# Patient Record
Sex: FEMALE | Race: OTHER | NOT HISPANIC OR LATINO
[De-identification: names, ages, dates, MRNs, and addresses within clinical notes are randomized per-mention and may not be internally consistent; named-entity substitution may affect disease eponyms.]

---

## 2017-06-27 ENCOUNTER — APPOINTMENT (OUTPATIENT)
Dept: NEUROLOGY | Facility: CLINIC | Age: 72
End: 2017-06-27

## 2017-06-27 VITALS
HEART RATE: 68 BPM | DIASTOLIC BLOOD PRESSURE: 78 MMHG | SYSTOLIC BLOOD PRESSURE: 119 MMHG | HEIGHT: 60 IN | WEIGHT: 137 LBS | BODY MASS INDEX: 26.9 KG/M2

## 2017-06-27 DIAGNOSIS — G60.8 OTHER HEREDITARY AND IDIOPATHIC NEUROPATHIES: ICD-10-CM

## 2017-06-27 DIAGNOSIS — Z86.79 PERSONAL HISTORY OF OTHER DISEASES OF THE CIRCULATORY SYSTEM: ICD-10-CM

## 2017-07-25 ENCOUNTER — APPOINTMENT (OUTPATIENT)
Dept: NEUROLOGY | Facility: CLINIC | Age: 72
End: 2017-07-25

## 2017-08-23 ENCOUNTER — EMERGENCY (EMERGENCY)
Facility: HOSPITAL | Age: 72
LOS: 1 days | Discharge: HOME | End: 2017-08-23
Attending: EMERGENCY MEDICINE | Admitting: EMERGENCY MEDICINE
Payer: COMMERCIAL

## 2017-08-23 VITALS
HEART RATE: 59 BPM | TEMPERATURE: 98 F | DIASTOLIC BLOOD PRESSURE: 81 MMHG | SYSTOLIC BLOOD PRESSURE: 135 MMHG | WEIGHT: 138.01 LBS | OXYGEN SATURATION: 97 % | RESPIRATION RATE: 16 BRPM | HEIGHT: 60 IN

## 2017-08-23 VITALS
RESPIRATION RATE: 16 BRPM | OXYGEN SATURATION: 95 % | TEMPERATURE: 98 F | HEART RATE: 63 BPM | SYSTOLIC BLOOD PRESSURE: 150 MMHG | DIASTOLIC BLOOD PRESSURE: 87 MMHG

## 2017-08-23 PROCEDURE — 73130 X-RAY EXAM OF HAND: CPT | Mod: 26,RT

## 2017-08-23 PROCEDURE — 73562 X-RAY EXAM OF KNEE 3: CPT | Mod: 26,LT

## 2017-08-23 PROCEDURE — 99284 EMERGENCY DEPT VISIT MOD MDM: CPT | Mod: 25

## 2017-08-23 PROCEDURE — 73130 X-RAY EXAM OF HAND: CPT

## 2017-08-23 PROCEDURE — 73562 X-RAY EXAM OF KNEE 3: CPT

## 2017-08-23 PROCEDURE — 99283 EMERGENCY DEPT VISIT LOW MDM: CPT

## 2017-08-23 RX ORDER — ACETAMINOPHEN 500 MG
650 TABLET ORAL ONCE
Qty: 0 | Refills: 0 | Status: COMPLETED | OUTPATIENT
Start: 2017-08-23 | End: 2017-08-23

## 2017-08-23 RX ADMIN — Medication 650 MILLIGRAM(S): at 11:48

## 2017-08-23 RX ADMIN — Medication 650 MILLIGRAM(S): at 12:26

## 2017-08-23 NOTE — ED PROVIDER NOTE - NS_ ATTENDINGSCRIBEDETAILS _ED_A_ED_FT
Laurent Perez MD - The scribe's documentation has been prepared under my direction and personally reviewed by me in its entirety. I confirm that the note above accurately reflects all work, treatment, procedures, and medical decision making performed by me.

## 2017-08-23 NOTE — ED PROVIDER NOTE - OBJECTIVE STATEMENT
70 y/o F pt presents to ED c/o right hand pain, left knee pain s/p trip and fall on sidewalk. No OTC medication PTA. No LOC. No back pain, neck pain. No further complaints at this time.

## 2017-08-23 NOTE — ED ADULT NURSE REASSESSMENT NOTE - NS ED NURSE REASSESS COMMENT FT1
Left knee was cleaned and dressed, discharge instructions were provided and reviewed, left accompanied by son, in stable condition.

## 2017-08-23 NOTE — ED PROCEDURE NOTE - NS ED PERI VASCULAR NEG
no swelling/no cyanosis of extremity/capillary refill time < 2 seconds/fingers/toes warm to touch/no paresthesia

## 2017-08-23 NOTE — ED ADULT NURSE NOTE - OBJECTIVE STATEMENT
Patient was walking on sidewalk and tripped over a branch, fell forward and put her hand out to prevent fall. Complaining of right hand and wrist pain and left knee pain. Abrasions noted to left knee, swelling and tenderness noted to right hand. Denies LOC, dizziness or head injury. On Eliquis.

## 2017-08-24 DIAGNOSIS — M79.641 PAIN IN RIGHT HAND: ICD-10-CM

## 2017-11-07 ENCOUNTER — APPOINTMENT (OUTPATIENT)
Dept: NEUROLOGY | Facility: CLINIC | Age: 72
End: 2017-11-07
Payer: MEDICARE

## 2017-11-07 PROCEDURE — 95886 MUSC TEST DONE W/N TEST COMP: CPT

## 2017-11-07 PROCEDURE — 95913 NRV CNDJ TEST 13/> STUDIES: CPT

## 2017-12-12 ENCOUNTER — APPOINTMENT (OUTPATIENT)
Dept: NEUROLOGY | Facility: CLINIC | Age: 72
End: 2017-12-12

## 2019-04-07 ENCOUNTER — TRANSCRIPTION ENCOUNTER (OUTPATIENT)
Age: 74
End: 2019-04-07

## 2020-04-09 NOTE — ED ADULT NURSE NOTE - EENT WDL
Cardiac
Eyes with no visual disturbances.  Ears clean and dry and no hearing difficulties. Nose with pink mucosa and no drainage.  Mouth mucous membranes moist and pink.  No tenderness or swelling to throat or neck.

## 2020-09-04 ENCOUNTER — APPOINTMENT (OUTPATIENT)
Dept: INTERNAL MEDICINE | Facility: CLINIC | Age: 75
End: 2020-09-04
Payer: MEDICARE

## 2020-09-04 VITALS — SYSTOLIC BLOOD PRESSURE: 150 MMHG | DIASTOLIC BLOOD PRESSURE: 80 MMHG

## 2020-09-04 VITALS
WEIGHT: 136 LBS | HEART RATE: 83 BPM | BODY MASS INDEX: 26.7 KG/M2 | HEIGHT: 60 IN | SYSTOLIC BLOOD PRESSURE: 144 MMHG | DIASTOLIC BLOOD PRESSURE: 82 MMHG | TEMPERATURE: 97.8 F | OXYGEN SATURATION: 96 %

## 2020-09-04 DIAGNOSIS — L30.9 DERMATITIS, UNSPECIFIED: ICD-10-CM

## 2020-09-04 DIAGNOSIS — Z23 ENCOUNTER FOR IMMUNIZATION: ICD-10-CM

## 2020-09-04 PROBLEM — I10 ESSENTIAL (PRIMARY) HYPERTENSION: Chronic | Status: ACTIVE | Noted: 2017-08-23

## 2020-09-04 PROBLEM — I48.91 UNSPECIFIED ATRIAL FIBRILLATION: Chronic | Status: ACTIVE | Noted: 2017-08-23

## 2020-09-04 PROBLEM — E78.00 PURE HYPERCHOLESTEROLEMIA, UNSPECIFIED: Chronic | Status: ACTIVE | Noted: 2017-08-23

## 2020-09-04 PROCEDURE — 99215 OFFICE O/P EST HI 40 MIN: CPT | Mod: 25

## 2020-09-04 PROCEDURE — G0008: CPT

## 2020-09-04 PROCEDURE — 90653 IIV ADJUVANT VACCINE IM: CPT

## 2020-09-04 RX ORDER — DULOXETINE HYDROCHLORIDE 30 MG/1
30 CAPSULE, DELAYED RELEASE PELLETS ORAL
Qty: 30 | Refills: 1 | Status: DISCONTINUED | COMMUNITY
Start: 2017-06-27 | End: 2020-09-04

## 2020-09-04 NOTE — PHYSICAL EXAM
[No Respiratory Distress] : no respiratory distress  [No Acute Distress] : no acute distress [No Carotid Bruits] : no carotid bruits [Normal] : soft, non-tender, non-distended, no masses palpated, no HSM and normal bowel sounds [de-identified] : S1 S2 IRREGULARLY IRREGULAR  [de-identified] : 1+ PITTING EDEMA LE  [de-identified] : + HYPERPIGMENTED AREA RIGHT UPPER BACK JUST UNDERN BRA STRAP ON THE RIGHT

## 2020-09-04 NOTE — HISTORY OF PRESENT ILLNESS
[FreeTextEntry1] : c/o itching upper back and also noted some mild lower extremity edema x several months  [de-identified] : H/O HTN ,DM , A FIB ,HYPERLIPIDEMIA \par C/O SOME ITCHING UPPER BACK AND SOME EDEMA LOWER EXTREMITIES , TAKES AMKODIPINE ON OCCASION WHEN BP IS ELEVATED , FOLLOWS IN OFFICE INFREQUENTLY BUT SEES HER CARDIOLOGIST ON A REGULAR BASIS

## 2020-09-04 NOTE — ASSESSMENT
[FreeTextEntry1] : CLINICALLY STABLE , ADVISED ROUTINE OFFICE VISITS A MUST \par ADVISED FLU VACCINE \par ADVISED TO INCREEASE DYAZIDE TO ONE TABLET DAILY , LOW SODIUM DIET

## 2020-09-25 ENCOUNTER — RX RENEWAL (OUTPATIENT)
Age: 75
End: 2020-09-25

## 2020-10-27 ENCOUNTER — APPOINTMENT (OUTPATIENT)
Dept: INTERNAL MEDICINE | Facility: CLINIC | Age: 75
End: 2020-10-27
Payer: MEDICARE

## 2020-10-27 ENCOUNTER — NON-APPOINTMENT (OUTPATIENT)
Age: 75
End: 2020-10-27

## 2020-10-27 VITALS
HEIGHT: 59 IN | OXYGEN SATURATION: 98 % | DIASTOLIC BLOOD PRESSURE: 84 MMHG | BODY MASS INDEX: 26.61 KG/M2 | TEMPERATURE: 97.9 F | SYSTOLIC BLOOD PRESSURE: 154 MMHG | WEIGHT: 132 LBS | HEART RATE: 80 BPM

## 2020-10-27 VITALS — DIASTOLIC BLOOD PRESSURE: 80 MMHG | SYSTOLIC BLOOD PRESSURE: 130 MMHG

## 2020-10-27 DIAGNOSIS — N32.81 OVERACTIVE BLADDER: ICD-10-CM

## 2020-10-27 DIAGNOSIS — I48.11 LONGSTANDING PERSISTENT ATRIAL FI: ICD-10-CM

## 2020-10-27 DIAGNOSIS — N39.46 MIXED INCONTINENCE: ICD-10-CM

## 2020-10-27 DIAGNOSIS — Z00.00 ENCOUNTER FOR GENERAL ADULT MEDICAL EXAMINATION W/OUT ABNORMAL FINDINGS: ICD-10-CM

## 2020-10-27 PROCEDURE — G0444 DEPRESSION SCREEN ANNUAL: CPT

## 2020-10-27 PROCEDURE — 99497 ADVNCD CARE PLAN 30 MIN: CPT | Mod: 33,25

## 2020-10-27 PROCEDURE — 36415 COLL VENOUS BLD VENIPUNCTURE: CPT

## 2020-10-27 PROCEDURE — 99213 OFFICE O/P EST LOW 20 MIN: CPT | Mod: 25

## 2020-10-27 PROCEDURE — G0439: CPT

## 2020-10-27 PROCEDURE — 99072 ADDL SUPL MATRL&STAF TM PHE: CPT

## 2020-10-27 PROCEDURE — 93000 ELECTROCARDIOGRAM COMPLETE: CPT | Mod: 59

## 2020-10-27 RX ORDER — AMLODIPINE BESYLATE 2.5 MG/1
2.5 TABLET ORAL
Refills: 0 | Status: DISCONTINUED | COMMUNITY
End: 2020-10-27

## 2020-10-27 NOTE — HEALTH RISK ASSESSMENT
[Patient reported mammogram was normal] : Patient reported mammogram was normal [Patient reported PAP Smear was normal] : Patient reported PAP Smear was normal [0] : 2) Feeling down, depressed, or hopeless: Not at all (0) [[id number]] : 0 [MammogramDate] : 08/2020 [PapSmearDate] : 08/2020 [ColonoscopyComments] : 7-8 YEARS AGO , DR PARKER  [AdvancecareDate] : 10/27/2020 [FreeTextEntry4] : HCP FORMS GIVEN

## 2020-10-27 NOTE — PHYSICAL EXAM
[No Acute Distress] : no acute distress [PERRL] : pupils equal round and reactive to light [EOMI] : extraocular movements intact [Normal Outer Ear/Nose] : the outer ears and nose were normal in appearance [No Lymphadenopathy] : no lymphadenopathy [Thyroid Normal, No Nodules] : the thyroid was normal and there were no nodules present [No Carotid Bruits] : no carotid bruits [No Abdominal Bruit] : a ~M bruit was not heard ~T in the abdomen [No Edema] : there was no peripheral edema [Declined Breast Exam] : declined breast exam  [Normal] : soft, non-tender, non-distended, no masses palpated, no HSM and normal bowel sounds [Declined Rectal Exam] : declined rectal exam [Normal Supraclavicular Nodes] : no supraclavicular lymphadenopathy [Normal Axillary Nodes] : no axillary lymphadenopathy [Normal Posterior Cervical Nodes] : no posterior cervical lymphadenopathy [Normal Anterior Cervical Nodes] : no anterior cervical lymphadenopathy [Normal Inguinal Nodes] : no inguinal lymphadenopathy [No CVA Tenderness] : no CVA  tenderness [No Focal Deficits] : no focal deficits [] : both feet [de-identified] : BY GYN [de-identified] : no suspicious skin lesions  [de-identified] : MULTIPLE DEFORMITIES OF TOES

## 2020-10-27 NOTE — HISTORY OF PRESENT ILLNESS
[de-identified] : PATIENT W/ H/O HTN , NIDDM , ATRIAL FIBRILLATION FOR ANNUAL PHYSICAL , C/O NEED TO URINATE FREQUENTLY AT TIMES AND HAS THE URGE TO GO TO THE BR AND UNABLE TO HOLD IT MANY TIMES , NO DYSURIA , HEMATURIA

## 2020-10-27 NOTE — ASSESSMENT
[FreeTextEntry1] : CLINICALLY STABLE , STATIN WAS D/C BY CARDIOLOGY , WILL CHECK LFT,  WILL GIVE TRIAL OF DITROPAN XL 5MG DAILY , RTO IN 4-6 WEEKS

## 2020-10-28 LAB
ALBUMIN SERPL ELPH-MCNC: 2.7 G/DL
ALP BLD-CCNC: 63 U/L
ALT SERPL-CCNC: 18 U/L
ANION GAP SERPL CALC-SCNC: 9 MMOL/L
AST SERPL-CCNC: 21 U/L
BASOPHILS # BLD AUTO: 0.05 K/UL
BASOPHILS NFR BLD AUTO: 0.7 %
BILIRUB SERPL-MCNC: 0.2 MG/DL
BUN SERPL-MCNC: 13 MG/DL
CALCIUM SERPL-MCNC: 8.6 MG/DL
CHLORIDE SERPL-SCNC: 103 MMOL/L
CHOLEST SERPL-MCNC: 421 MG/DL
CO2 SERPL-SCNC: 28 MMOL/L
CREAT SERPL-MCNC: 0.77 MG/DL
CREAT SPEC-SCNC: 100 MG/DL
DIGOXIN SERPL-MCNC: 0.6 NG/ML
EOSINOPHIL # BLD AUTO: 0.17 K/UL
EOSINOPHIL NFR BLD AUTO: 2.5 %
ESTIMATED AVERAGE GLUCOSE: 128 MG/DL
GLUCOSE SERPL-MCNC: 103 MG/DL
GLUCOSE SERPL-MCNC: 99 MG/DL
HBA1C MFR BLD HPLC: 6.1 %
HCT VFR BLD CALC: 45.7 %
HDLC SERPL-MCNC: 34 MG/DL
HGB BLD-MCNC: 15.3 G/DL
IMM GRANULOCYTES NFR BLD AUTO: 1.2 %
LDLC SERPL CALC-MCNC: NORMAL MG/DL
LYMPHOCYTES # BLD AUTO: 2.21 K/UL
LYMPHOCYTES NFR BLD AUTO: 32.9 %
MAN DIFF?: NORMAL
MCHC RBC-ENTMCNC: 30.8 PG
MCHC RBC-ENTMCNC: 33.5 GM/DL
MCV RBC AUTO: 92.1 FL
MICROALBUMIN 24H UR DL<=1MG/L-MCNC: 332.4 MG/DL
MICROALBUMIN/CREAT 24H UR-RTO: 3336 MG/G
MONOCYTES # BLD AUTO: 0.58 K/UL
MONOCYTES NFR BLD AUTO: 8.6 %
NEUTROPHILS # BLD AUTO: 3.63 K/UL
NEUTROPHILS NFR BLD AUTO: 54.1 %
NONHDLC SERPL-MCNC: 386 MG/DL
PLATELET # BLD AUTO: 266 K/UL
POTASSIUM SERPL-SCNC: 3.7 MMOL/L
PROT SERPL-MCNC: 4.8 G/DL
RBC # BLD: 4.96 M/UL
RBC # FLD: 12.5 %
SODIUM SERPL-SCNC: 140 MMOL/L
TRIGL SERPL-MCNC: 464 MG/DL
WBC # FLD AUTO: 6.72 K/UL

## 2020-10-30 LAB
APPEARANCE: CLEAR
BACTERIA UR CULT: NORMAL
BACTERIA: ABNORMAL
BILIRUBIN URINE: NEGATIVE
BLOOD URINE: NEGATIVE
COLOR: YELLOW
GLUCOSE QUALITATIVE U: NEGATIVE
KETONES URINE: NEGATIVE
LEUKOCYTE ESTERASE URINE: NEGATIVE
MICROSCOPIC-UA: NORMAL
NITRITE URINE: NEGATIVE
PH URINE: 7
PROTEIN URINE: ABNORMAL
RED BLOOD CELLS URINE: 2 /HPF
SPECIFIC GRAVITY URINE: 1.02
SQUAMOUS EPITHELIAL CELLS: 6 /HPF
UROBILINOGEN URINE: NORMAL
WHITE BLOOD CELLS URINE: 5 /HPF

## 2020-11-27 ENCOUNTER — APPOINTMENT (OUTPATIENT)
Dept: INTERNAL MEDICINE | Facility: CLINIC | Age: 75
End: 2020-11-27
Payer: MEDICARE

## 2020-11-27 VITALS
SYSTOLIC BLOOD PRESSURE: 135 MMHG | DIASTOLIC BLOOD PRESSURE: 94 MMHG | HEART RATE: 74 BPM | OXYGEN SATURATION: 95 % | WEIGHT: 135 LBS | BODY MASS INDEX: 27.27 KG/M2

## 2020-11-27 PROCEDURE — 81003 URINALYSIS AUTO W/O SCOPE: CPT | Mod: QW

## 2020-11-27 PROCEDURE — 99213 OFFICE O/P EST LOW 20 MIN: CPT

## 2020-11-27 NOTE — PHYSICAL EXAM
[No Acute Distress] : no acute distress [No Lymphadenopathy] : no lymphadenopathy [No Edema] : there was no peripheral edema [Normal] : no CVA or spinal tenderness

## 2020-11-30 LAB
APPEARANCE: ABNORMAL
BACTERIA UR CULT: NORMAL
BACTERIA: NEGATIVE
BILIRUB UR QL STRIP: NORMAL
BILIRUBIN URINE: NEGATIVE
BLOOD URINE: NORMAL
COLOR: YELLOW
GLUCOSE QUALITATIVE U: ABNORMAL
GLUCOSE UR-MCNC: 100
HCG UR QL: 0.2 EU/DL
HGB UR QL STRIP.AUTO: NORMAL
HYALINE CASTS: 4 /LPF
KETONES UR-MCNC: NORMAL
KETONES URINE: NEGATIVE
LEUKOCYTE ESTERASE UR QL STRIP: NORMAL
LEUKOCYTE ESTERASE URINE: NEGATIVE
MICROSCOPIC-UA: NORMAL
NITRITE UR QL STRIP: NORMAL
NITRITE URINE: NEGATIVE
PH UR STRIP: 7
PH URINE: 7
PROT UR STRIP-MCNC: NORMAL
PROTEIN URINE: ABNORMAL
RED BLOOD CELLS URINE: 6 /HPF
SP GR UR STRIP: 1.02
SPECIFIC GRAVITY URINE: 1.01
SQUAMOUS EPITHELIAL CELLS: 9 /HPF
UROBILINOGEN URINE: NORMAL
WHITE BLOOD CELLS URINE: 10 /HPF

## 2020-12-04 ENCOUNTER — APPOINTMENT (OUTPATIENT)
Dept: INTERNAL MEDICINE | Facility: CLINIC | Age: 75
End: 2020-12-04
Payer: MEDICARE

## 2020-12-04 VITALS
OXYGEN SATURATION: 97 % | HEART RATE: 75 BPM | SYSTOLIC BLOOD PRESSURE: 141 MMHG | WEIGHT: 130 LBS | BODY MASS INDEX: 26.26 KG/M2 | DIASTOLIC BLOOD PRESSURE: 88 MMHG

## 2020-12-04 DIAGNOSIS — N39.0 URINARY TRACT INFECTION, SITE NOT SPECIFIED: ICD-10-CM

## 2020-12-04 PROCEDURE — 81003 URINALYSIS AUTO W/O SCOPE: CPT | Mod: QW

## 2020-12-04 PROCEDURE — 99213 OFFICE O/P EST LOW 20 MIN: CPT

## 2020-12-04 PROCEDURE — 99072 ADDL SUPL MATRL&STAF TM PHE: CPT

## 2020-12-04 NOTE — ASSESSMENT
[FreeTextEntry1] : ADVISED TO INCREASE PO FLUIDS , U/A AND CULTURE , WILL PRESCRIBE MACROBID X 5 DAYS

## 2020-12-04 NOTE — HISTORY OF PRESENT ILLNESS
[FreeTextEntry1] : URINARY FREQUENCY  [de-identified] : PATIENT C/O URINARY FREQUENCY X SEVERAL DAYS , NO FEVER , CHILLS , GROSS HEMATURIA , DOES C/O SOME ITCHING IN AREA , NO VAGINAL DISCHARGE ,

## 2020-12-04 NOTE — PHYSICAL EXAM
[No Acute Distress] : no acute distress [No Lymphadenopathy] : no lymphadenopathy [No Edema] : there was no peripheral edema [Normal] : soft, non-tender, non-distended, no masses palpated, no HSM and normal bowel sounds [No CVA Tenderness] : no CVA  tenderness

## 2020-12-07 LAB
APPEARANCE: CLEAR
BACTERIA: NEGATIVE
BILIRUB UR QL STRIP: NORMAL
BILIRUBIN URINE: NEGATIVE
BLOOD URINE: NORMAL
COLOR: COLORLESS
GLUCOSE QUALITATIVE U: NEGATIVE
GLUCOSE UR-MCNC: NORMAL
HCG UR QL: 0.2 EU/DL
HGB UR QL STRIP.AUTO: NORMAL
HYALINE CASTS: 0 /LPF
KETONES UR-MCNC: NORMAL
KETONES URINE: NEGATIVE
LEUKOCYTE ESTERASE UR QL STRIP: NORMAL
LEUKOCYTE ESTERASE URINE: NEGATIVE
MICROSCOPIC-UA: NORMAL
NITRITE UR QL STRIP: NORMAL
NITRITE URINE: NEGATIVE
PH UR STRIP: 7
PH URINE: 7
PROT UR STRIP-MCNC: NORMAL
PROTEIN URINE: ABNORMAL
RED BLOOD CELLS URINE: 7 /HPF
SP GR UR STRIP: 1.01
SPECIFIC GRAVITY URINE: 1.01
SQUAMOUS EPITHELIAL CELLS: 3 /HPF
UROBILINOGEN URINE: NORMAL
WHITE BLOOD CELLS URINE: 4 /HPF

## 2020-12-08 ENCOUNTER — RX RENEWAL (OUTPATIENT)
Age: 75
End: 2020-12-08

## 2021-01-22 ENCOUNTER — RX RENEWAL (OUTPATIENT)
Age: 76
End: 2021-01-22

## 2021-01-22 RX ORDER — LANCETS 28 GAUGE
EACH MISCELLANEOUS
Qty: 100 | Refills: 4 | Status: ACTIVE | COMMUNITY
Start: 2020-12-08 | End: 1900-01-01

## 2021-02-03 ENCOUNTER — APPOINTMENT (OUTPATIENT)
Dept: INTERNAL MEDICINE | Facility: CLINIC | Age: 76
End: 2021-02-03

## 2021-02-26 ENCOUNTER — APPOINTMENT (OUTPATIENT)
Dept: INTERNAL MEDICINE | Facility: CLINIC | Age: 76
End: 2021-02-26

## 2021-04-12 ENCOUNTER — APPOINTMENT (OUTPATIENT)
Dept: DISASTER EMERGENCY | Facility: OTHER | Age: 76
End: 2021-04-12
Payer: MEDICARE

## 2021-04-12 PROCEDURE — 0012A: CPT

## 2021-05-07 ENCOUNTER — APPOINTMENT (OUTPATIENT)
Dept: INTERNAL MEDICINE | Facility: CLINIC | Age: 76
End: 2021-05-07
Payer: MEDICARE

## 2021-05-07 ENCOUNTER — NON-APPOINTMENT (OUTPATIENT)
Age: 76
End: 2021-05-07

## 2021-05-07 VITALS
HEART RATE: 73 BPM | WEIGHT: 136 LBS | SYSTOLIC BLOOD PRESSURE: 134 MMHG | BODY MASS INDEX: 27.42 KG/M2 | OXYGEN SATURATION: 97 % | DIASTOLIC BLOOD PRESSURE: 79 MMHG | TEMPERATURE: 97.7 F | RESPIRATION RATE: 12 BRPM | HEIGHT: 59 IN

## 2021-05-07 VITALS — SYSTOLIC BLOOD PRESSURE: 120 MMHG | DIASTOLIC BLOOD PRESSURE: 80 MMHG

## 2021-05-07 DIAGNOSIS — R73.03 PREDIABETES.: ICD-10-CM

## 2021-05-07 DIAGNOSIS — R25.2 CRAMP AND SPASM: ICD-10-CM

## 2021-05-07 DIAGNOSIS — R60.0 LOCALIZED EDEMA: ICD-10-CM

## 2021-05-07 LAB
BILIRUB UR QL STRIP: NEGATIVE
CLARITY UR: CLEAR
COLLECTION METHOD: NORMAL
GLUCOSE UR-MCNC: NEGATIVE
HCG UR QL: 0.2 EU/DL
HGB UR QL STRIP.AUTO: NORMAL
KETONES UR-MCNC: NEGATIVE
LEUKOCYTE ESTERASE UR QL STRIP: NEGATIVE
NITRITE UR QL STRIP: NEGATIVE
PH UR STRIP: 7
PROT UR STRIP-MCNC: 300
SP GR UR STRIP: 1.01

## 2021-05-07 PROCEDURE — 99072 ADDL SUPL MATRL&STAF TM PHE: CPT

## 2021-05-07 PROCEDURE — 99215 OFFICE O/P EST HI 40 MIN: CPT

## 2021-05-07 PROCEDURE — 81003 URINALYSIS AUTO W/O SCOPE: CPT | Mod: QW

## 2021-05-07 RX ORDER — TRIAMTERENE AND HYDROCHLOROTHIAZIDE 25; 37.5 MG/1; MG/1
37.5-25 TABLET ORAL
Refills: 0 | Status: DISCONTINUED | COMMUNITY
End: 2021-05-07

## 2021-05-07 NOTE — PHYSICAL EXAM
[No Acute Distress] : no acute distress [Normal Sclera/Conjunctiva] : normal sclera/conjunctiva [No Lymphadenopathy] : no lymphadenopathy [Thyroid Normal, No Nodules] : the thyroid was normal and there were no nodules present [No Carotid Bruits] : no carotid bruits [Normal] : soft, non-tender, non-distended, no masses palpated, no HSM and normal bowel sounds [Normal Supraclavicular Nodes] : no supraclavicular lymphadenopathy [Normal Axillary Nodes] : no axillary lymphadenopathy [Normal Anterior Cervical Nodes] : no anterior cervical lymphadenopathy [Normal Inguinal Nodes] : no inguinal lymphadenopathy [No CVA Tenderness] : no CVA  tenderness [No Spinal Tenderness] : no spinal tenderness [No Focal Deficits] : no focal deficits [de-identified] : + 1-2 LE EDEMA , PITTING

## 2021-05-07 NOTE — HISTORY OF PRESENT ILLNESS
[FreeTextEntry1] : C/O NOCTURNAL LEG CRAMPS  [de-identified] : PATIENT W/ H/O ATRIAL FIBRILLATION , HLD , PRE DIABETES C/O LOWER EXTREMITY EDEMA FOR WHICH SHE SAW HER CARDIOLOGIST WHO REFERRED PATIENT TO RENAL FOR EVALUATION , TODAY SHE C/O NOCTURNAL LEG CRAMPS , SHE DENIES ANY CHEST PAINS , SOB . SHE SAW RENAL  SPECIALIST BUT IS LOOKING TO GET A 2ND OPINION , NO URINARY SYMPTOMS , SHE DID NOT TOLERATE STATIN AND WAS STARTED ON REPATHA INJECTION W/ GOOD RESULTS , SHE STATES COMPLIANCE W/ HER ELIQUIS , NO RECENT TARVEL

## 2021-05-07 NOTE — ASSESSMENT
[FreeTextEntry1] : RECOMMENDATIONS AS ABOVE , ADVISED LEG ELEVATION WHEN POSSIBLE , OBTAIN RECENT M/R FROM DR TORRES

## 2021-05-08 LAB
APPEARANCE: CLEAR
BACTERIA: NEGATIVE
BILIRUBIN URINE: NEGATIVE
BLOOD URINE: NORMAL
COLOR: COLORLESS
GLUCOSE QUALITATIVE U: NEGATIVE
HYALINE CASTS: 0 /LPF
KETONES URINE: NEGATIVE
LEUKOCYTE ESTERASE URINE: NEGATIVE
MICROSCOPIC-UA: NORMAL
NITRITE URINE: NEGATIVE
PH URINE: 7
PROTEIN URINE: ABNORMAL
RED BLOOD CELLS URINE: 5 /HPF
SPECIFIC GRAVITY URINE: 1
SQUAMOUS EPITHELIAL CELLS: 1 /HPF
UROBILINOGEN URINE: NORMAL
WHITE BLOOD CELLS URINE: 1 /HPF

## 2021-05-11 LAB — BACTERIA UR CULT: ABNORMAL

## 2021-06-01 ENCOUNTER — APPOINTMENT (OUTPATIENT)
Dept: UROLOGY | Facility: CLINIC | Age: 76
End: 2021-06-01

## 2021-06-01 VITALS
HEART RATE: 79 BPM | DIASTOLIC BLOOD PRESSURE: 79 MMHG | SYSTOLIC BLOOD PRESSURE: 151 MMHG | TEMPERATURE: 98 F | BODY MASS INDEX: 27.42 KG/M2 | HEIGHT: 59 IN | WEIGHT: 136 LBS | RESPIRATION RATE: 17 BRPM

## 2021-06-01 NOTE — REVIEW OF SYSTEMS
[Chills] : chills [Feeling Tired] : feeling tired [Eyesight Problems] : eyesight problems [Dry Eyes] : dryness of the eyes [Palpitations] : palpitations [see HPI] : see HPI [Urine Infection (bladder/kidney)] : bladder/kidney infection [Told you have blood in urine on a urine test] : told blood was present in a urine test [Wake up at night to urinate  How many times?  ___] : wakes up to urinate [unfilled] times during the night [Strong urge to urinate] : strong urge to urinate [Bladder pressure] : experiences bladder pressure [Joint Pain] : joint pain [Joint Swelling] : joint swelling [Limb Swelling] : limb swelling [Itching] : itching [Difficulty Walking] : difficulty walking [Depression] : depression [Muscle Weakness] : muscle weakness [Feelings Of Weakness] : feelings of weakness [Easy Bruising] : a tendency for easy bruising [Negative] : Gastrointestinal

## 2021-06-04 ENCOUNTER — APPOINTMENT (OUTPATIENT)
Dept: INTERNAL MEDICINE | Facility: CLINIC | Age: 76
End: 2021-06-04
Payer: MEDICARE

## 2021-06-04 VITALS — SYSTOLIC BLOOD PRESSURE: 120 MMHG | DIASTOLIC BLOOD PRESSURE: 80 MMHG

## 2021-06-04 VITALS
TEMPERATURE: 98.2 F | WEIGHT: 135 LBS | OXYGEN SATURATION: 96 % | BODY MASS INDEX: 27.27 KG/M2 | SYSTOLIC BLOOD PRESSURE: 150 MMHG | HEART RATE: 77 BPM | DIASTOLIC BLOOD PRESSURE: 87 MMHG

## 2021-06-04 DIAGNOSIS — K64.9 UNSPECIFIED HEMORRHOIDS: ICD-10-CM

## 2021-06-04 PROCEDURE — 99213 OFFICE O/P EST LOW 20 MIN: CPT

## 2021-06-04 NOTE — HISTORY OF PRESENT ILLNESS
[FreeTextEntry1] : ROUTINE FOLLOW UP  [de-identified] : 1. PROTEINURIA : SAW RENAL AND HAS RENAL BIOPSY TO BE SCHEDULED \par 2. H/O HEMORRHOIDS: C/O IRRITATION    AND PAIN \par 3. NIDDM

## 2021-07-31 ENCOUNTER — APPOINTMENT (OUTPATIENT)
Dept: DISASTER EMERGENCY | Facility: CLINIC | Age: 76
End: 2021-07-31

## 2021-07-31 DIAGNOSIS — Z01.818 ENCOUNTER FOR OTHER PREPROCEDURAL EXAMINATION: ICD-10-CM

## 2021-07-31 LAB — SARS-COV-2 N GENE NPH QL NAA+PROBE: NOT DETECTED

## 2021-08-03 ENCOUNTER — OUTPATIENT (OUTPATIENT)
Dept: OUTPATIENT SERVICES | Facility: HOSPITAL | Age: 76
LOS: 1 days | End: 2021-08-03
Payer: MEDICARE

## 2021-08-03 PROCEDURE — 85610 PROTHROMBIN TIME: CPT

## 2021-08-03 PROCEDURE — 36415 COLL VENOUS BLD VENIPUNCTURE: CPT

## 2021-08-03 PROCEDURE — 85027 COMPLETE CBC AUTOMATED: CPT

## 2021-08-03 PROCEDURE — 93005 ELECTROCARDIOGRAM TRACING: CPT

## 2021-08-03 PROCEDURE — 80048 BASIC METABOLIC PNL TOTAL CA: CPT

## 2021-08-03 RX ORDER — GLYBURIDE 5 MG
1 TABLET ORAL
Qty: 0 | Refills: 0 | DISCHARGE

## 2021-08-03 RX ORDER — CARVEDILOL PHOSPHATE 80 MG/1
1 CAPSULE, EXTENDED RELEASE ORAL
Qty: 0 | Refills: 0 | DISCHARGE

## 2021-08-03 RX ORDER — OLMESARTAN MEDOXOMIL-HYDROCHLOROTHIAZIDE 25; 40 MG/1; MG/1
1 TABLET, FILM COATED ORAL
Qty: 0 | Refills: 0 | DISCHARGE

## 2021-08-12 DIAGNOSIS — N04.9 NEPHROTIC SYNDROME WITH UNSPECIFIED MORPHOLOGIC CHANGES: ICD-10-CM

## 2021-08-12 PROBLEM — E78.5 HYPERLIPIDEMIA, UNSPECIFIED: Chronic | Status: ACTIVE | Noted: 2021-08-02

## 2021-08-12 PROBLEM — N18.9 CHRONIC KIDNEY DISEASE, UNSPECIFIED: Chronic | Status: ACTIVE | Noted: 2021-08-02

## 2021-08-12 PROBLEM — I25.10 ATHEROSCLEROTIC HEART DISEASE OF NATIVE CORONARY ARTERY WITHOUT ANGINA PECTORIS: Chronic | Status: ACTIVE | Noted: 2021-08-02

## 2021-08-12 PROBLEM — K21.9 GASTRO-ESOPHAGEAL REFLUX DISEASE WITHOUT ESOPHAGITIS: Chronic | Status: ACTIVE | Noted: 2021-08-02

## 2021-08-12 PROBLEM — E11.9 TYPE 2 DIABETES MELLITUS WITHOUT COMPLICATIONS: Chronic | Status: ACTIVE | Noted: 2021-08-02

## 2021-08-12 PROBLEM — I63.9 CEREBRAL INFARCTION, UNSPECIFIED: Chronic | Status: ACTIVE | Noted: 2021-08-02

## 2021-08-13 DIAGNOSIS — N04.9 NEPHROTIC SYNDROME WITH UNSPECIFIED MORPHOLOGIC CHANGES: ICD-10-CM

## 2021-08-14 ENCOUNTER — RX RENEWAL (OUTPATIENT)
Age: 76
End: 2021-08-14

## 2021-09-08 ENCOUNTER — APPOINTMENT (OUTPATIENT)
Dept: INTERNAL MEDICINE | Facility: CLINIC | Age: 76
End: 2021-09-08
Payer: MEDICARE

## 2021-09-08 VITALS
HEART RATE: 64 BPM | TEMPERATURE: 98 F | RESPIRATION RATE: 12 BRPM | OXYGEN SATURATION: 96 % | HEIGHT: 59 IN | WEIGHT: 138 LBS | BODY MASS INDEX: 27.82 KG/M2 | DIASTOLIC BLOOD PRESSURE: 79 MMHG | SYSTOLIC BLOOD PRESSURE: 146 MMHG

## 2021-09-08 VITALS — DIASTOLIC BLOOD PRESSURE: 80 MMHG | SYSTOLIC BLOOD PRESSURE: 130 MMHG

## 2021-09-08 DIAGNOSIS — E78.5 HYPERLIPIDEMIA, UNSPECIFIED: ICD-10-CM

## 2021-09-08 DIAGNOSIS — I10 ESSENTIAL (PRIMARY) HYPERTENSION: ICD-10-CM

## 2021-09-08 DIAGNOSIS — R80.9 PROTEINURIA, UNSPECIFIED: ICD-10-CM

## 2021-09-08 DIAGNOSIS — E11.9 TYPE 2 DIABETES MELLITUS W/OUT COMPLICATIONS: ICD-10-CM

## 2021-09-08 DIAGNOSIS — I48.91 UNSPECIFIED ATRIAL FIBRILLATION: ICD-10-CM

## 2021-09-08 PROCEDURE — 99214 OFFICE O/P EST MOD 30 MIN: CPT

## 2021-09-08 RX ORDER — HYDRALAZINE HYDROCHLORIDE 25 MG/1
25 TABLET ORAL 3 TIMES DAILY
Qty: 270 | Refills: 0 | Status: ACTIVE | COMMUNITY
Start: 2021-09-08

## 2021-09-08 RX ORDER — ROSUVASTATIN CALCIUM 5 MG/1
5 TABLET, FILM COATED ORAL
Qty: 90 | Refills: 0 | Status: DISCONTINUED | COMMUNITY
Start: 2020-10-28 | End: 2021-09-08

## 2021-09-08 NOTE — PHYSICAL EXAM
[No Acute Distress] : no acute distress [No Lymphadenopathy] : no lymphadenopathy [No Murmur] : no murmur heard [No Carotid Bruits] : no carotid bruits [No Edema] : there was no peripheral edema [Normal] : soft, non-tender, non-distended, no masses palpated, no HSM and normal bowel sounds [No CVA Tenderness] : no CVA  tenderness [de-identified] : S1 S2 IRREGULARLY IRREGULAR  [de-identified] : LARGE OVAL ECCHYMOSIS RIGHT UPPER EXT BELOW SHOULDER

## 2021-09-08 NOTE — HISTORY OF PRESENT ILLNESS
[FreeTextEntry1] : ROUTINE FOLLOW UP  [de-identified] : 1. HTN : FEELS WELL , HAD RECENT ADDITION OF HYDRALAZINE FOR BETTER BP CONTROL \par 2. A FIB : ON ELIQUIS , HAD RECENT LARGE ECCHYMOSIS RIGHT UPPER ARM AND ELIQUIS WAS HELD X 1 DAY AS PER CARDIO\par 3. PROTEINURIA : SHE IS SCHEDULED FOR RENAL BIOPSY \par 4.  NIDDM

## 2021-10-20 DIAGNOSIS — Z01.818 ENCOUNTER FOR OTHER PREPROCEDURAL EXAMINATION: ICD-10-CM

## 2021-10-22 ENCOUNTER — APPOINTMENT (OUTPATIENT)
Dept: DISASTER EMERGENCY | Facility: CLINIC | Age: 76
End: 2021-10-22

## 2021-10-22 NOTE — ASU PATIENT PROFILE, ADULT - NSICDXPASTSURGICALHX_GEN_ALL_CORE_FT
PAST SURGICAL HISTORY:  H/O angioplasty     H/O  section     History of bunionectomy     History of heart surgery cardiac stents    History of nasal surgery

## 2021-10-22 NOTE — ASU PATIENT PROFILE, ADULT - NSICDXPASTMEDICALHX_GEN_ALL_CORE_FT
PAST MEDICAL HISTORY:  Atrial fibrillation     CAD (coronary artery disease)     CKD (chronic kidney disease)     CVA (cerebral vascular accident)     Diabetes type 2    GERD (gastroesophageal reflux disease)     High cholesterol     HLD (hyperlipidemia)     Hypertension      PAST MEDICAL HISTORY:  Atrial fibrillation     CAD (coronary artery disease)     CKD (chronic kidney disease)     CVA (cerebral vascular accident)     Diabetes type 2    GERD (gastroesophageal reflux disease)     High cholesterol     HLD (hyperlipidemia)     Hypertension     Myocardial infarct

## 2021-10-23 LAB — SARS-COV-2 N GENE NPH QL NAA+PROBE: NOT DETECTED

## 2021-10-25 ENCOUNTER — OUTPATIENT (OUTPATIENT)
Dept: INPATIENT UNIT | Facility: HOSPITAL | Age: 76
LOS: 1 days | Discharge: HOME | End: 2021-10-25
Payer: MEDICARE

## 2021-10-25 ENCOUNTER — RESULT REVIEW (OUTPATIENT)
Age: 76
End: 2021-10-25

## 2021-10-25 VITALS
SYSTOLIC BLOOD PRESSURE: 153 MMHG | DIASTOLIC BLOOD PRESSURE: 87 MMHG | HEART RATE: 68 BPM | OXYGEN SATURATION: 95 % | HEIGHT: 60 IN | RESPIRATION RATE: 15 BRPM | WEIGHT: 134.92 LBS | TEMPERATURE: 97 F

## 2021-10-25 VITALS
HEART RATE: 76 BPM | SYSTOLIC BLOOD PRESSURE: 164 MMHG | TEMPERATURE: 97 F | DIASTOLIC BLOOD PRESSURE: 95 MMHG | RESPIRATION RATE: 14 BRPM | OXYGEN SATURATION: 98 %

## 2021-10-25 DIAGNOSIS — Z98.890 OTHER SPECIFIED POSTPROCEDURAL STATES: Chronic | ICD-10-CM

## 2021-10-25 DIAGNOSIS — Z98.62 PERIPHERAL VASCULAR ANGIOPLASTY STATUS: Chronic | ICD-10-CM

## 2021-10-25 DIAGNOSIS — N04.9 NEPHROTIC SYNDROME WITH UNSPECIFIED MORPHOLOGIC CHANGES: ICD-10-CM

## 2021-10-25 DIAGNOSIS — Z98.891 HISTORY OF UTERINE SCAR FROM PREVIOUS SURGERY: Chronic | ICD-10-CM

## 2021-10-25 LAB
ANION GAP SERPL CALC-SCNC: 6 MMOL/L — SIGNIFICANT CHANGE UP (ref 5–17)
BUN SERPL-MCNC: 20 MG/DL — SIGNIFICANT CHANGE UP (ref 7–23)
CALCIUM SERPL-MCNC: 8.7 MG/DL — SIGNIFICANT CHANGE UP (ref 8.5–10.1)
CHLORIDE SERPL-SCNC: 104 MMOL/L — SIGNIFICANT CHANGE UP (ref 96–108)
CO2 SERPL-SCNC: 25 MMOL/L — SIGNIFICANT CHANGE UP (ref 22–31)
CREAT SERPL-MCNC: 0.85 MG/DL — SIGNIFICANT CHANGE UP (ref 0.5–1.3)
GLUCOSE SERPL-MCNC: 102 MG/DL — HIGH (ref 70–99)
HCT VFR BLD CALC: 39.9 % — SIGNIFICANT CHANGE UP (ref 34.5–45)
HGB BLD-MCNC: 13.3 G/DL — SIGNIFICANT CHANGE UP (ref 11.5–15.5)
INR BLD: 1.01 RATIO — SIGNIFICANT CHANGE UP (ref 0.88–1.16)
MCHC RBC-ENTMCNC: 30 PG — SIGNIFICANT CHANGE UP (ref 27–34)
MCHC RBC-ENTMCNC: 33.3 GM/DL — SIGNIFICANT CHANGE UP (ref 32–36)
MCV RBC AUTO: 90.1 FL — SIGNIFICANT CHANGE UP (ref 80–100)
PLATELET # BLD AUTO: 274 K/UL — SIGNIFICANT CHANGE UP (ref 150–400)
POTASSIUM SERPL-MCNC: 3.6 MMOL/L — SIGNIFICANT CHANGE UP (ref 3.5–5.3)
POTASSIUM SERPL-SCNC: 3.6 MMOL/L — SIGNIFICANT CHANGE UP (ref 3.5–5.3)
PROTHROM AB SERPL-ACNC: 11.8 SEC — SIGNIFICANT CHANGE UP (ref 10.6–13.6)
RBC # BLD: 4.43 M/UL — SIGNIFICANT CHANGE UP (ref 3.8–5.2)
RBC # FLD: 13.1 % — SIGNIFICANT CHANGE UP (ref 10.3–14.5)
SODIUM SERPL-SCNC: 135 MMOL/L — SIGNIFICANT CHANGE UP (ref 135–145)
WBC # BLD: 6.47 K/UL — SIGNIFICANT CHANGE UP (ref 3.8–10.5)
WBC # FLD AUTO: 6.47 K/UL — SIGNIFICANT CHANGE UP (ref 3.8–10.5)

## 2021-10-25 PROCEDURE — 88305 TISSUE EXAM BY PATHOLOGIST: CPT

## 2021-10-25 PROCEDURE — 88313 SPECIAL STAINS GROUP 2: CPT

## 2021-10-25 PROCEDURE — 88346 IMFLUOR 1ST 1ANTB STAIN PX: CPT

## 2021-10-25 PROCEDURE — 50200 RENAL BIOPSY PERQ: CPT | Mod: RT

## 2021-10-25 PROCEDURE — 88346 IMFLUOR 1ST 1ANTB STAIN PX: CPT | Mod: 26

## 2021-10-25 PROCEDURE — 88305 TISSUE EXAM BY PATHOLOGIST: CPT | Mod: 26

## 2021-10-25 PROCEDURE — 77012 CT SCAN FOR NEEDLE BIOPSY: CPT

## 2021-10-25 PROCEDURE — 88350 IMFLUOR EA ADDL 1ANTB STN PX: CPT

## 2021-10-25 PROCEDURE — 77012 CT SCAN FOR NEEDLE BIOPSY: CPT | Mod: 26

## 2021-10-25 PROCEDURE — 88348 ELECTRON MICROSCOPY DX: CPT

## 2021-10-25 PROCEDURE — 36415 COLL VENOUS BLD VENIPUNCTURE: CPT

## 2021-10-25 PROCEDURE — 88350 IMFLUOR EA ADDL 1ANTB STN PX: CPT | Mod: 26

## 2021-10-25 PROCEDURE — 85610 PROTHROMBIN TIME: CPT

## 2021-10-25 PROCEDURE — 85027 COMPLETE CBC AUTOMATED: CPT

## 2021-10-25 PROCEDURE — 88348 ELECTRON MICROSCOPY DX: CPT | Mod: 26

## 2021-10-25 PROCEDURE — 80048 BASIC METABOLIC PNL TOTAL CA: CPT

## 2021-10-25 PROCEDURE — 88313 SPECIAL STAINS GROUP 2: CPT | Mod: 26

## 2021-10-25 RX ORDER — FENTANYL CITRATE 50 UG/ML
25 INJECTION INTRAVENOUS
Refills: 0 | Status: DISCONTINUED | OUTPATIENT
Start: 2021-10-25 | End: 2021-10-25

## 2021-10-25 RX ORDER — SODIUM CHLORIDE 9 MG/ML
1000 INJECTION INTRAMUSCULAR; INTRAVENOUS; SUBCUTANEOUS
Refills: 0 | Status: DISCONTINUED | OUTPATIENT
Start: 2021-10-25 | End: 2021-10-25

## 2021-10-25 RX ORDER — ONDANSETRON 8 MG/1
4 TABLET, FILM COATED ORAL EVERY 6 HOURS
Refills: 0 | Status: DISCONTINUED | OUTPATIENT
Start: 2021-10-25 | End: 2021-10-25

## 2021-10-25 RX ORDER — OXYCODONE HYDROCHLORIDE 5 MG/1
5 TABLET ORAL ONCE
Refills: 0 | Status: DISCONTINUED | OUTPATIENT
Start: 2021-10-25 | End: 2021-10-25

## 2021-10-25 RX ADMIN — SODIUM CHLORIDE 50 MILLILITER(S): 9 INJECTION INTRAMUSCULAR; INTRAVENOUS; SUBCUTANEOUS at 10:45

## 2021-11-02 DIAGNOSIS — Z86.73 PERSONAL HISTORY OF TRANSIENT ISCHEMIC ATTACK (TIA), AND CEREBRAL INFARCTION WITHOUT RESIDUAL DEFICITS: ICD-10-CM

## 2021-11-02 DIAGNOSIS — E11.22 TYPE 2 DIABETES MELLITUS WITH DIABETIC CHRONIC KIDNEY DISEASE: ICD-10-CM

## 2021-11-02 DIAGNOSIS — K21.9 GASTRO-ESOPHAGEAL REFLUX DISEASE WITHOUT ESOPHAGITIS: ICD-10-CM

## 2021-11-02 DIAGNOSIS — Z79.52 LONG TERM (CURRENT) USE OF SYSTEMIC STEROIDS: ICD-10-CM

## 2021-11-02 DIAGNOSIS — Z79.01 LONG TERM (CURRENT) USE OF ANTICOAGULANTS: ICD-10-CM

## 2021-11-02 DIAGNOSIS — Z91.040 LATEX ALLERGY STATUS: ICD-10-CM

## 2021-11-02 DIAGNOSIS — I25.10 ATHEROSCLEROTIC HEART DISEASE OF NATIVE CORONARY ARTERY WITHOUT ANGINA PECTORIS: ICD-10-CM

## 2021-11-02 DIAGNOSIS — I48.91 UNSPECIFIED ATRIAL FIBRILLATION: ICD-10-CM

## 2021-11-02 DIAGNOSIS — Z91.041 RADIOGRAPHIC DYE ALLERGY STATUS: ICD-10-CM

## 2021-11-02 DIAGNOSIS — Z79.83 LONG TERM (CURRENT) USE OF BISPHOSPHONATES: ICD-10-CM

## 2021-11-02 DIAGNOSIS — Z87.891 PERSONAL HISTORY OF NICOTINE DEPENDENCE: ICD-10-CM

## 2021-11-02 DIAGNOSIS — E78.5 HYPERLIPIDEMIA, UNSPECIFIED: ICD-10-CM

## 2021-11-02 DIAGNOSIS — N18.9 CHRONIC KIDNEY DISEASE, UNSPECIFIED: ICD-10-CM

## 2021-11-02 DIAGNOSIS — I65.23 OCCLUSION AND STENOSIS OF BILATERAL CAROTID ARTERIES: ICD-10-CM

## 2021-11-02 DIAGNOSIS — I12.9 HYPERTENSIVE CHRONIC KIDNEY DISEASE WITH STAGE 1 THROUGH STAGE 4 CHRONIC KIDNEY DISEASE, OR UNSPECIFIED CHRONIC KIDNEY DISEASE: ICD-10-CM

## 2021-11-02 DIAGNOSIS — Z95.5 PRESENCE OF CORONARY ANGIOPLASTY IMPLANT AND GRAFT: ICD-10-CM

## 2022-02-24 ENCOUNTER — APPOINTMENT (OUTPATIENT)
Dept: INTERNAL MEDICINE | Facility: CLINIC | Age: 77
End: 2022-02-24

## 2022-09-09 ENCOUNTER — RX RENEWAL (OUTPATIENT)
Age: 77
End: 2022-09-09

## 2022-09-09 RX ORDER — BLOOD SUGAR DIAGNOSTIC
STRIP MISCELLANEOUS
Qty: 100 | Refills: 4 | Status: ACTIVE | COMMUNITY
Start: 2020-09-25 | End: 1900-01-01

## 2023-01-06 ENCOUNTER — EMERGENCY (EMERGENCY)
Facility: HOSPITAL | Age: 78
LOS: 1 days | Discharge: ANOTHER HOSPITAL | End: 2023-01-06
Attending: EMERGENCY MEDICINE | Admitting: EMERGENCY MEDICINE
Payer: MEDICARE

## 2023-01-06 VITALS
WEIGHT: 125 LBS | HEIGHT: 60 IN | HEART RATE: 120 BPM | OXYGEN SATURATION: 97 % | SYSTOLIC BLOOD PRESSURE: 193 MMHG | TEMPERATURE: 98 F | RESPIRATION RATE: 22 BRPM | DIASTOLIC BLOOD PRESSURE: 105 MMHG

## 2023-01-06 DIAGNOSIS — Z98.890 OTHER SPECIFIED POSTPROCEDURAL STATES: Chronic | ICD-10-CM

## 2023-01-06 DIAGNOSIS — Z98.62 PERIPHERAL VASCULAR ANGIOPLASTY STATUS: Chronic | ICD-10-CM

## 2023-01-06 DIAGNOSIS — Z98.891 HISTORY OF UTERINE SCAR FROM PREVIOUS SURGERY: Chronic | ICD-10-CM

## 2023-01-06 LAB
ALBUMIN SERPL ELPH-MCNC: 3.8 G/DL — SIGNIFICANT CHANGE UP (ref 3.3–5)
ALP SERPL-CCNC: 86 U/L — SIGNIFICANT CHANGE UP (ref 30–120)
ALT FLD-CCNC: 29 U/L DA — SIGNIFICANT CHANGE UP (ref 10–60)
ANION GAP SERPL CALC-SCNC: 12 MMOL/L — SIGNIFICANT CHANGE UP (ref 5–17)
AST SERPL-CCNC: 28 U/L — SIGNIFICANT CHANGE UP (ref 10–40)
BASOPHILS # BLD AUTO: 0.04 K/UL — SIGNIFICANT CHANGE UP (ref 0–0.2)
BASOPHILS NFR BLD AUTO: 0.4 % — SIGNIFICANT CHANGE UP (ref 0–2)
BILIRUB SERPL-MCNC: 0.5 MG/DL — SIGNIFICANT CHANGE UP (ref 0.2–1.2)
BUN SERPL-MCNC: 18 MG/DL — SIGNIFICANT CHANGE UP (ref 7–23)
CALCIUM SERPL-MCNC: 9.7 MG/DL — SIGNIFICANT CHANGE UP (ref 8.4–10.5)
CHLORIDE SERPL-SCNC: 100 MMOL/L — SIGNIFICANT CHANGE UP (ref 96–108)
CO2 SERPL-SCNC: 26 MMOL/L — SIGNIFICANT CHANGE UP (ref 22–31)
CREAT SERPL-MCNC: 0.84 MG/DL — SIGNIFICANT CHANGE UP (ref 0.5–1.3)
DIGOXIN SERPL-MCNC: 0.2 NG/ML — LOW (ref 0.8–2)
EGFR: 72 ML/MIN/1.73M2 — SIGNIFICANT CHANGE UP
EOSINOPHIL # BLD AUTO: 0.05 K/UL — SIGNIFICANT CHANGE UP (ref 0–0.5)
EOSINOPHIL NFR BLD AUTO: 0.5 % — SIGNIFICANT CHANGE UP (ref 0–6)
FLUAV AG NPH QL: SIGNIFICANT CHANGE UP
FLUBV AG NPH QL: SIGNIFICANT CHANGE UP
GLUCOSE BLDC GLUCOMTR-MCNC: 160 MG/DL — HIGH (ref 70–99)
GLUCOSE SERPL-MCNC: 168 MG/DL — HIGH (ref 70–99)
HCT VFR BLD CALC: 47.8 % — HIGH (ref 34.5–45)
HGB BLD-MCNC: 16.3 G/DL — HIGH (ref 11.5–15.5)
IMM GRANULOCYTES NFR BLD AUTO: 1 % — HIGH (ref 0–0.9)
LYMPHOCYTES # BLD AUTO: 1.02 K/UL — SIGNIFICANT CHANGE UP (ref 1–3.3)
LYMPHOCYTES # BLD AUTO: 11.1 % — LOW (ref 13–44)
MCHC RBC-ENTMCNC: 30.6 PG — SIGNIFICANT CHANGE UP (ref 27–34)
MCHC RBC-ENTMCNC: 34.1 GM/DL — SIGNIFICANT CHANGE UP (ref 32–36)
MCV RBC AUTO: 89.8 FL — SIGNIFICANT CHANGE UP (ref 80–100)
MONOCYTES # BLD AUTO: 0.49 K/UL — SIGNIFICANT CHANGE UP (ref 0–0.9)
MONOCYTES NFR BLD AUTO: 5.3 % — SIGNIFICANT CHANGE UP (ref 2–14)
NEUTROPHILS # BLD AUTO: 7.52 K/UL — HIGH (ref 1.8–7.4)
NEUTROPHILS NFR BLD AUTO: 81.7 % — HIGH (ref 43–77)
NRBC # BLD: 0 /100 WBCS — SIGNIFICANT CHANGE UP (ref 0–0)
PLATELET # BLD AUTO: 278 K/UL — SIGNIFICANT CHANGE UP (ref 150–400)
POTASSIUM SERPL-MCNC: 3.1 MMOL/L — LOW (ref 3.5–5.3)
POTASSIUM SERPL-SCNC: 3.1 MMOL/L — LOW (ref 3.5–5.3)
PROT SERPL-MCNC: 8 G/DL — SIGNIFICANT CHANGE UP (ref 6–8.3)
RBC # BLD: 5.32 M/UL — HIGH (ref 3.8–5.2)
RBC # FLD: 13.5 % — SIGNIFICANT CHANGE UP (ref 10.3–14.5)
RSV RNA NPH QL NAA+NON-PROBE: SIGNIFICANT CHANGE UP
SARS-COV-2 RNA SPEC QL NAA+PROBE: SIGNIFICANT CHANGE UP
SODIUM SERPL-SCNC: 138 MMOL/L — SIGNIFICANT CHANGE UP (ref 135–145)
TROPONIN I, HIGH SENSITIVITY RESULT: 17.7 NG/L — SIGNIFICANT CHANGE UP
WBC # BLD: 9.21 K/UL — SIGNIFICANT CHANGE UP (ref 3.8–10.5)
WBC # FLD AUTO: 9.21 K/UL — SIGNIFICANT CHANGE UP (ref 3.8–10.5)

## 2023-01-06 PROCEDURE — 99285 EMERGENCY DEPT VISIT HI MDM: CPT

## 2023-01-06 PROCEDURE — 84484 ASSAY OF TROPONIN QUANT: CPT

## 2023-01-06 PROCEDURE — 70450 CT HEAD/BRAIN W/O DYE: CPT | Mod: MA

## 2023-01-06 PROCEDURE — 72125 CT NECK SPINE W/O DYE: CPT | Mod: 26,MA

## 2023-01-06 PROCEDURE — 82962 GLUCOSE BLOOD TEST: CPT

## 2023-01-06 PROCEDURE — 80053 COMPREHEN METABOLIC PANEL: CPT

## 2023-01-06 PROCEDURE — 71045 X-RAY EXAM CHEST 1 VIEW: CPT

## 2023-01-06 PROCEDURE — 70450 CT HEAD/BRAIN W/O DYE: CPT | Mod: 26,MA

## 2023-01-06 PROCEDURE — 36415 COLL VENOUS BLD VENIPUNCTURE: CPT

## 2023-01-06 PROCEDURE — 87637 SARSCOV2&INF A&B&RSV AMP PRB: CPT

## 2023-01-06 PROCEDURE — 96375 TX/PRO/DX INJ NEW DRUG ADDON: CPT

## 2023-01-06 PROCEDURE — 85025 COMPLETE CBC W/AUTO DIFF WBC: CPT

## 2023-01-06 PROCEDURE — 96374 THER/PROPH/DIAG INJ IV PUSH: CPT

## 2023-01-06 PROCEDURE — 99285 EMERGENCY DEPT VISIT HI MDM: CPT | Mod: 25

## 2023-01-06 PROCEDURE — 96376 TX/PRO/DX INJ SAME DRUG ADON: CPT

## 2023-01-06 PROCEDURE — 93010 ELECTROCARDIOGRAM REPORT: CPT

## 2023-01-06 PROCEDURE — 71045 X-RAY EXAM CHEST 1 VIEW: CPT | Mod: 26

## 2023-01-06 PROCEDURE — 72125 CT NECK SPINE W/O DYE: CPT | Mod: MA

## 2023-01-06 PROCEDURE — 80162 ASSAY OF DIGOXIN TOTAL: CPT

## 2023-01-06 PROCEDURE — 93005 ELECTROCARDIOGRAM TRACING: CPT

## 2023-01-06 RX ORDER — ERGOCALCIFEROL 1.25 MG/1
50000 CAPSULE ORAL
Qty: 0 | Refills: 0 | DISCHARGE

## 2023-01-06 RX ORDER — ONDANSETRON 8 MG/1
4 TABLET, FILM COATED ORAL ONCE
Refills: 0 | Status: COMPLETED | OUTPATIENT
Start: 2023-01-06 | End: 2023-01-06

## 2023-01-06 RX ORDER — DILTIAZEM HCL 120 MG
10 CAPSULE, EXT RELEASE 24 HR ORAL ONCE
Refills: 0 | Status: COMPLETED | OUTPATIENT
Start: 2023-01-06 | End: 2023-01-06

## 2023-01-06 RX ORDER — MECLIZINE HCL 12.5 MG
25 TABLET ORAL ONCE
Refills: 0 | Status: COMPLETED | OUTPATIENT
Start: 2023-01-06 | End: 2023-01-06

## 2023-01-06 RX ORDER — RIBOFLAVIN (VITAMIN B2) 25 MG
1 TABLET ORAL
Qty: 0 | Refills: 0 | DISCHARGE

## 2023-01-06 RX ORDER — CARVEDILOL PHOSPHATE 80 MG/1
6.25 CAPSULE, EXTENDED RELEASE ORAL EVERY 12 HOURS
Refills: 0 | Status: DISCONTINUED | OUTPATIENT
Start: 2023-01-06 | End: 2023-01-10

## 2023-01-06 RX ORDER — SODIUM CHLORIDE 9 MG/ML
1000 INJECTION INTRAMUSCULAR; INTRAVENOUS; SUBCUTANEOUS ONCE
Refills: 0 | Status: COMPLETED | OUTPATIENT
Start: 2023-01-06 | End: 2023-01-06

## 2023-01-06 RX ORDER — DIGOXIN 250 MCG
1 TABLET ORAL
Qty: 0 | Refills: 0 | DISCHARGE

## 2023-01-06 RX ORDER — POTASSIUM CHLORIDE 20 MEQ
40 PACKET (EA) ORAL ONCE
Refills: 0 | Status: COMPLETED | OUTPATIENT
Start: 2023-01-06 | End: 2023-01-06

## 2023-01-06 RX ORDER — METOCLOPRAMIDE HCL 10 MG
10 TABLET ORAL ONCE
Refills: 0 | Status: COMPLETED | OUTPATIENT
Start: 2023-01-06 | End: 2023-01-06

## 2023-01-06 RX ORDER — HYDRALAZINE HCL 50 MG
1 TABLET ORAL
Qty: 0 | Refills: 0 | DISCHARGE

## 2023-01-06 RX ORDER — HYDRALAZINE HCL 50 MG
10 TABLET ORAL ONCE
Refills: 0 | Status: COMPLETED | OUTPATIENT
Start: 2023-01-06 | End: 2023-01-06

## 2023-01-06 RX ORDER — EVOLOCUMAB 140 MG/ML
1 INJECTION, SOLUTION SUBCUTANEOUS
Qty: 0 | Refills: 0 | DISCHARGE

## 2023-01-06 RX ORDER — HYDRALAZINE HCL 50 MG
0 TABLET ORAL
Qty: 0 | Refills: 0 | DISCHARGE

## 2023-01-06 RX ADMIN — Medication 40 MILLIEQUIVALENT(S): at 20:25

## 2023-01-06 RX ADMIN — Medication 10 MILLIGRAM(S): at 20:30

## 2023-01-06 RX ADMIN — Medication 25 MILLIGRAM(S): at 19:52

## 2023-01-06 RX ADMIN — Medication 10 MILLIGRAM(S): at 19:51

## 2023-01-06 RX ADMIN — ONDANSETRON 4 MILLIGRAM(S): 8 TABLET, FILM COATED ORAL at 21:20

## 2023-01-06 RX ADMIN — SODIUM CHLORIDE 1000 MILLILITER(S): 9 INJECTION INTRAMUSCULAR; INTRAVENOUS; SUBCUTANEOUS at 19:49

## 2023-01-06 RX ADMIN — SODIUM CHLORIDE 1000 MILLILITER(S): 9 INJECTION INTRAMUSCULAR; INTRAVENOUS; SUBCUTANEOUS at 20:26

## 2023-01-06 RX ADMIN — SODIUM CHLORIDE 1000 MILLILITER(S): 9 INJECTION INTRAMUSCULAR; INTRAVENOUS; SUBCUTANEOUS at 21:58

## 2023-01-06 RX ADMIN — SODIUM CHLORIDE 1000 MILLILITER(S): 9 INJECTION INTRAMUSCULAR; INTRAVENOUS; SUBCUTANEOUS at 23:00

## 2023-01-06 RX ADMIN — Medication 10 MILLIGRAM(S): at 21:46

## 2023-01-06 RX ADMIN — CARVEDILOL PHOSPHATE 6.25 MILLIGRAM(S): 80 CAPSULE, EXTENDED RELEASE ORAL at 20:25

## 2023-01-06 RX ADMIN — Medication 10 MILLIGRAM(S): at 19:50

## 2023-01-06 NOTE — ED PROVIDER NOTE - NEUROLOGICAL, MLM
Alert and oriented, no focal deficits, no motor or sensory deficits. Symmetric eyebrow raise and smile.  Elevates tongue and shoulders without difficulty.  Able to raise all extremities and hold for 10 seconds

## 2023-01-06 NOTE — ED PROVIDER NOTE - NS ED ATTENDING STATEMENT MOD
This was a shared visit with the JON. I reviewed and verified the documentation and independently performed the documented:

## 2023-01-06 NOTE — ED PROVIDER NOTE - NSICDXPASTMEDICALHX_GEN_ALL_CORE_FT
PAST MEDICAL HISTORY:  Atrial fibrillation     CAD (coronary artery disease)     CKD (chronic kidney disease)     CVA (cerebral vascular accident)     Diabetes type 2    GERD (gastroesophageal reflux disease)     High cholesterol     HLD (hyperlipidemia)     Hypertension     Myocardial infarct

## 2023-01-06 NOTE — ED PROVIDER NOTE - GASTROINTESTINAL NEGATIVE STATEMENT, MLM
29-Nov-2021 23:00
no abdominal pain, no bloating, no constipation, no diarrhea, +nausea, no vomiting

## 2023-01-06 NOTE — ED PROVIDER NOTE - PROGRESS NOTE DETAILS
Repeat heart rate 154/73.  Potassium given in emergency room.  IV fluids infusing.  Head CT results discussed with patient and family.  Suspect a 3 mm subdural hematoma.  Patient stable.  No neurodeficits.  Continues to feel dizzy and nausea.  Zofran given.  Spoke with transfer center, and neurosurgeon Dr. Shrestha.  States likely not surgical candidate and would likely not need neurosurgical intervention.  Discussed patient can stay at [facility] and repeat the CAT scan in the morning.  If no change, no intervention is needed.  If there is any worsening symptoms, may be transferred at that time but discussed this is a low suspicion.  Recommends to stop anticoagulation.  Consider Andexxa.  Spoke with [facility] PA. will plan to take patient to [facility] pending hospitalist acceptance. spoke with Dr. Carrasco, refusing patient for admission due to subdural hematoma. spoke with transfer center and Dr. Shrestha. Dr. Shrestha does not refuse transfer but states patient does not medically need to be transfered. Transfer Center currently putting transfer on hold since not medical necessity and currently on diversion. they will contact hospital administration and call back Repeat heart rate 154/73.  heart rate improved. Potassium given in emergency room.  IV fluids infusing.  Head CT results discussed with patient and family.  Suspect a 3 mm subdural hematoma.  Patient stable.  No neurodeficits.  Continues to feel dizzy and nausea.  Zofran given.  Spoke with transfer center, and neurosurgeon Dr. Shrestha.  States likely not surgical candidate and would likely not need neurosurgical intervention.  Discussed patient can stay at [facility] and repeat the CAT scan in the morning.  If no change, no intervention is needed.  If there is any worsening symptoms, may be transferred at that time but discussed this is a low suspicion.  Recommends to stop anticoagulation.  Consider Andexxa.  Spoke with [facility] PA. will plan to take patient to [facility] pending hospitalist acceptance. spoke with Dr. Carrasco, refusing patient for admission due to subdural hematoma. spoke with transfer center and Dr. Shrestha. Dr. Shrestha does not refuse transfer but states patient does not medically need to be transfered. Transfer Center currently putting transfer on hold since not medical necessity and currently on diversion. they will contact hospital administration and call back waiting to hear back from transfer center and administration, Dr. Bryan will assume care Case d/w Dr. Carrasco and Dr. Bills.  Patient should not be kept at [facility], must be transferred for neurosurgical care.

## 2023-01-06 NOTE — ED ADULT NURSE NOTE - OBJECTIVE STATEMENT
" I feel dizzy, shaky, my blood pressure has been high, I have a headache since 12/25 "    patient has hx of DM2, stents x 3 in 2012, patient is on Eliquis

## 2023-01-06 NOTE — ED PROVIDER NOTE - OBJECTIVE STATEMENT
77-year-old female with history of coronary artery disease, atrial fibrillation, and hypertension brought in by ambulance for elevated blood pressure, headaches, dizziness, feeling shaky, palpitations, nausea.  Patient reports has been having elevated blood pressure and headache for several months.  Was seen by cardiologist in October and was prescribed clonidine for her elevated blood pressure.  Has had continued headache since then.  Patient had her ears cleaned out on December 20 by ENT.  Started to have dizziness, nausea, unsteady gait 2 days later.  Patient believes it was related to the ear cleaning.  Dizziness worse when she turns her head to the right.  Was seen by primary care doctor December 29 for headaches, dizziness, and facial pain.  Had a head CT which was unremarkable.  Patient also reports has been having chest pain and palpitations for several weeks.  Denies any abdominal pain.  Denies vomiting or diarrhea.  No urinary complaints. Patient does report she stopped taking the clonidine 2 days ago because she thought that was causing some of her dizziness.  Also believes it was causing her mouth to be dry.  PCP Madisyn Kwan   nephrologist Zachery Bowman

## 2023-01-06 NOTE — ED PROVIDER NOTE - DIFFERENTIAL DIAGNOSIS
Differential including but not limited to arrhythmia MI pneumonia pneumothorax effusion intracranial hemorrhage flu COVID hypertensive urgency Differential Diagnosis

## 2023-01-06 NOTE — ED ADULT TRIAGE NOTE - MEANS OF ARRIVAL
W[employer]peel Pregnancy And Lactation Text: This medication is Pregnancy Category X and contraindicated in pregnancy and in women who may become pregnant. It is unknown if this medication is excreted in breast milk. ambulatory

## 2023-01-06 NOTE — ED PROVIDER NOTE - CARE PLAN
Principal Discharge DX:	Subdural hematoma  Secondary Diagnosis:	Atrial fibrillation  Secondary Diagnosis:	Hypertension  Secondary Diagnosis:	Dizziness   1

## 2023-01-06 NOTE — ED PROVIDER NOTE - CLINICAL SUMMARY MEDICAL DECISION MAKING FREE TEXT BOX
Patient complaining of headache since October 22 and room spinning dizziness worse when looking to the right associated with nausea since December 22.  Patient relates had her ears cleaned 3 days prior to the dizziness beginning which she thought caused her symptoms.  Patient saw her PMD was sent to [facility] for either an MRI or CT of the head (patient unsure which test).  Patient also relates that she stopped taking her clonidine a few days ago because she thought maybe that was causing dizziness.  Patient also complaining of palpitations chest pressure constant for weeks.  No fevers chills short of breath cough abdominal pain vomiting focal weakness or numbness slurred speech.    Plan EKG chest x-ray CT head labs IV fluid Antivert Reglan hydralazine  Differential including but not limited to arrhythmia MI pneumonia pneumothorax effusion intracranial hemorrhage flu COVID hypertensive urgency

## 2023-01-06 NOTE — ED ADULT NURSE REASSESSMENT NOTE - NS ED NURSE REASSESS COMMENT FT1
received report and assumed care of pt at change of shift. pt heart rate up to 150's. c/o palpitations. MD and PA aware. pt medicated as ordered. cm remains ST. pt informed of CT results. informed of possible transfer to another [facility]. pt informed staff she wants to go to [place]. pt's family in waiting room. will continue to monitor received report and assumed care of pt at change of shift. pt heart rate up to 150's. c/o palpitations. MD and PA aware. pt medicated as ordered. pt informed of CT results. informed of possible transfer to another [facility]. pt informed staff she wants to go to [place]. pt's family in waiting room. will continue to monitor

## 2023-01-07 ENCOUNTER — INPATIENT (INPATIENT)
Facility: HOSPITAL | Age: 78
LOS: 1 days | Discharge: HOME | DRG: 65 | End: 2023-01-09
Attending: SPECIALIST | Admitting: SPECIALIST
Payer: MEDICARE

## 2023-01-07 VITALS
RESPIRATION RATE: 17 BRPM | TEMPERATURE: 99 F | HEART RATE: 99 BPM | SYSTOLIC BLOOD PRESSURE: 131 MMHG | OXYGEN SATURATION: 95 % | DIASTOLIC BLOOD PRESSURE: 89 MMHG

## 2023-01-07 VITALS
WEIGHT: 125 LBS | HEART RATE: 115 BPM | DIASTOLIC BLOOD PRESSURE: 91 MMHG | HEIGHT: 60 IN | RESPIRATION RATE: 18 BRPM | OXYGEN SATURATION: 93 % | TEMPERATURE: 99 F | SYSTOLIC BLOOD PRESSURE: 117 MMHG

## 2023-01-07 DIAGNOSIS — Z98.890 OTHER SPECIFIED POSTPROCEDURAL STATES: Chronic | ICD-10-CM

## 2023-01-07 DIAGNOSIS — Z98.62 PERIPHERAL VASCULAR ANGIOPLASTY STATUS: Chronic | ICD-10-CM

## 2023-01-07 DIAGNOSIS — Z91.040 LATEX ALLERGY STATUS: ICD-10-CM

## 2023-01-07 DIAGNOSIS — E78.00 PURE HYPERCHOLESTEROLEMIA, UNSPECIFIED: ICD-10-CM

## 2023-01-07 DIAGNOSIS — N18.9 CHRONIC KIDNEY DISEASE, UNSPECIFIED: ICD-10-CM

## 2023-01-07 DIAGNOSIS — I12.9 HYPERTENSIVE CHRONIC KIDNEY DISEASE WITH STAGE 1 THROUGH STAGE 4 CHRONIC KIDNEY DISEASE, OR UNSPECIFIED CHRONIC KIDNEY DISEASE: ICD-10-CM

## 2023-01-07 DIAGNOSIS — I48.91 UNSPECIFIED ATRIAL FIBRILLATION: ICD-10-CM

## 2023-01-07 DIAGNOSIS — I62.01 NONTRAUMATIC ACUTE SUBDURAL HEMORRHAGE: ICD-10-CM

## 2023-01-07 DIAGNOSIS — Z88.8 ALLERGY STATUS TO OTHER DRUGS, MEDICAMENTS AND BIOLOGICAL SUBSTANCES: ICD-10-CM

## 2023-01-07 DIAGNOSIS — I25.2 OLD MYOCARDIAL INFARCTION: ICD-10-CM

## 2023-01-07 DIAGNOSIS — I25.10 ATHEROSCLEROTIC HEART DISEASE OF NATIVE CORONARY ARTERY WITHOUT ANGINA PECTORIS: ICD-10-CM

## 2023-01-07 DIAGNOSIS — R07.89 OTHER CHEST PAIN: ICD-10-CM

## 2023-01-07 DIAGNOSIS — Z98.891 HISTORY OF UTERINE SCAR FROM PREVIOUS SURGERY: Chronic | ICD-10-CM

## 2023-01-07 DIAGNOSIS — Z91.041 RADIOGRAPHIC DYE ALLERGY STATUS: ICD-10-CM

## 2023-01-07 DIAGNOSIS — I10 ESSENTIAL (PRIMARY) HYPERTENSION: ICD-10-CM

## 2023-01-07 DIAGNOSIS — I48.0 PAROXYSMAL ATRIAL FIBRILLATION: ICD-10-CM

## 2023-01-07 DIAGNOSIS — I48.19 OTHER PERSISTENT ATRIAL FIBRILLATION: ICD-10-CM

## 2023-01-07 DIAGNOSIS — Z79.01 LONG TERM (CURRENT) USE OF ANTICOAGULANTS: ICD-10-CM

## 2023-01-07 DIAGNOSIS — E11.22 TYPE 2 DIABETES MELLITUS WITH DIABETIC CHRONIC KIDNEY DISEASE: ICD-10-CM

## 2023-01-07 DIAGNOSIS — Z95.5 PRESENCE OF CORONARY ANGIOPLASTY IMPLANT AND GRAFT: ICD-10-CM

## 2023-01-07 DIAGNOSIS — I67.1 CEREBRAL ANEURYSM, NONRUPTURED: ICD-10-CM

## 2023-01-07 PROBLEM — I21.9 ACUTE MYOCARDIAL INFARCTION, UNSPECIFIED: Chronic | Status: ACTIVE | Noted: 2021-10-25

## 2023-01-07 LAB
ALBUMIN SERPL ELPH-MCNC: 3 G/DL — LOW (ref 3.3–5)
ALP SERPL-CCNC: 60 U/L — SIGNIFICANT CHANGE UP (ref 40–120)
ALT FLD-CCNC: 24 U/L — SIGNIFICANT CHANGE UP (ref 12–78)
ANION GAP SERPL CALC-SCNC: 7 MMOL/L — SIGNIFICANT CHANGE UP (ref 5–17)
AST SERPL-CCNC: 25 U/L — SIGNIFICANT CHANGE UP (ref 15–37)
BILIRUB SERPL-MCNC: 0.8 MG/DL — SIGNIFICANT CHANGE UP (ref 0.2–1.2)
BUN SERPL-MCNC: 12 MG/DL — SIGNIFICANT CHANGE UP (ref 7–23)
CALCIUM SERPL-MCNC: 8.6 MG/DL — SIGNIFICANT CHANGE UP (ref 8.5–10.1)
CHLORIDE SERPL-SCNC: 111 MMOL/L — HIGH (ref 96–108)
CO2 SERPL-SCNC: 24 MMOL/L — SIGNIFICANT CHANGE UP (ref 22–31)
CREAT SERPL-MCNC: 0.64 MG/DL — SIGNIFICANT CHANGE UP (ref 0.5–1.3)
EGFR: 91 ML/MIN/1.73M2 — SIGNIFICANT CHANGE UP
GLUCOSE SERPL-MCNC: 98 MG/DL — SIGNIFICANT CHANGE UP (ref 70–99)
HCT VFR BLD CALC: 41.6 % — SIGNIFICANT CHANGE UP (ref 34.5–45)
HGB BLD-MCNC: 13.9 G/DL — SIGNIFICANT CHANGE UP (ref 11.5–15.5)
MAGNESIUM SERPL-MCNC: 1.8 MG/DL — SIGNIFICANT CHANGE UP (ref 1.6–2.6)
MCHC RBC-ENTMCNC: 30.2 PG — SIGNIFICANT CHANGE UP (ref 27–34)
MCHC RBC-ENTMCNC: 33.4 GM/DL — SIGNIFICANT CHANGE UP (ref 32–36)
MCV RBC AUTO: 90.4 FL — SIGNIFICANT CHANGE UP (ref 80–100)
PHOSPHATE SERPL-MCNC: 3.3 MG/DL — SIGNIFICANT CHANGE UP (ref 2.5–4.5)
PLATELET # BLD AUTO: 247 K/UL — SIGNIFICANT CHANGE UP (ref 150–400)
POTASSIUM SERPL-MCNC: 3.2 MMOL/L — LOW (ref 3.5–5.3)
POTASSIUM SERPL-SCNC: 3.2 MMOL/L — LOW (ref 3.5–5.3)
PROT SERPL-MCNC: 6.4 GM/DL — SIGNIFICANT CHANGE UP (ref 6–8.3)
RBC # BLD: 4.6 M/UL — SIGNIFICANT CHANGE UP (ref 3.8–5.2)
RBC # FLD: 13.9 % — SIGNIFICANT CHANGE UP (ref 10.3–14.5)
SODIUM SERPL-SCNC: 142 MMOL/L — SIGNIFICANT CHANGE UP (ref 135–145)
WBC # BLD: 8.41 K/UL — SIGNIFICANT CHANGE UP (ref 3.8–10.5)
WBC # FLD AUTO: 8.41 K/UL — SIGNIFICANT CHANGE UP (ref 3.8–10.5)

## 2023-01-07 PROCEDURE — 97162 PT EVAL MOD COMPLEX 30 MIN: CPT | Mod: GP

## 2023-01-07 PROCEDURE — 80048 BASIC METABOLIC PNL TOTAL CA: CPT

## 2023-01-07 PROCEDURE — 97116 GAIT TRAINING THERAPY: CPT | Mod: GP

## 2023-01-07 PROCEDURE — 86803 HEPATITIS C AB TEST: CPT

## 2023-01-07 PROCEDURE — 84100 ASSAY OF PHOSPHORUS: CPT

## 2023-01-07 PROCEDURE — 70450 CT HEAD/BRAIN W/O DYE: CPT | Mod: 26,MA

## 2023-01-07 PROCEDURE — 99053 MED SERV 10PM-8AM 24 HR FAC: CPT

## 2023-01-07 PROCEDURE — 85027 COMPLETE CBC AUTOMATED: CPT

## 2023-01-07 PROCEDURE — 99221 1ST HOSP IP/OBS SF/LOW 40: CPT

## 2023-01-07 PROCEDURE — 83735 ASSAY OF MAGNESIUM: CPT

## 2023-01-07 PROCEDURE — 80053 COMPREHEN METABOLIC PANEL: CPT

## 2023-01-07 PROCEDURE — 99285 EMERGENCY DEPT VISIT HI MDM: CPT

## 2023-01-07 PROCEDURE — 99223 1ST HOSP IP/OBS HIGH 75: CPT

## 2023-01-07 PROCEDURE — 93306 TTE W/DOPPLER COMPLETE: CPT

## 2023-01-07 PROCEDURE — 93005 ELECTROCARDIOGRAM TRACING: CPT

## 2023-01-07 PROCEDURE — 36415 COLL VENOUS BLD VENIPUNCTURE: CPT

## 2023-01-07 PROCEDURE — 99233 SBSQ HOSP IP/OBS HIGH 50: CPT

## 2023-01-07 RX ORDER — ACETAMINOPHEN 500 MG
1000 TABLET ORAL EVERY 6 HOURS
Refills: 0 | Status: DISCONTINUED | OUTPATIENT
Start: 2023-01-07 | End: 2023-01-09

## 2023-01-07 RX ORDER — CARVEDILOL PHOSPHATE 80 MG/1
6.25 CAPSULE, EXTENDED RELEASE ORAL EVERY 12 HOURS
Refills: 0 | Status: DISCONTINUED | OUTPATIENT
Start: 2023-01-07 | End: 2023-01-07

## 2023-01-07 RX ORDER — POTASSIUM CHLORIDE 20 MEQ
40 PACKET (EA) ORAL ONCE
Refills: 0 | Status: COMPLETED | OUTPATIENT
Start: 2023-01-07 | End: 2023-01-07

## 2023-01-07 RX ORDER — DIGOXIN 250 MCG
250 TABLET ORAL ONCE
Refills: 0 | Status: COMPLETED | OUTPATIENT
Start: 2023-01-07 | End: 2023-01-07

## 2023-01-07 RX ORDER — ALPRAZOLAM 0.25 MG
0.25 TABLET ORAL
Refills: 0 | Status: DISCONTINUED | OUTPATIENT
Start: 2023-01-07 | End: 2023-01-09

## 2023-01-07 RX ORDER — DIGOXIN 250 MCG
125 TABLET ORAL DAILY
Refills: 0 | Status: DISCONTINUED | OUTPATIENT
Start: 2023-01-07 | End: 2023-01-09

## 2023-01-07 RX ORDER — CARVEDILOL PHOSPHATE 80 MG/1
12.5 CAPSULE, EXTENDED RELEASE ORAL EVERY 12 HOURS
Refills: 0 | Status: DISCONTINUED | OUTPATIENT
Start: 2023-01-07 | End: 2023-01-08

## 2023-01-07 RX ORDER — HYDRALAZINE HCL 50 MG
50 TABLET ORAL THREE TIMES A DAY
Refills: 0 | Status: DISCONTINUED | OUTPATIENT
Start: 2023-01-07 | End: 2023-01-09

## 2023-01-07 RX ADMIN — Medication 40 MILLIEQUIVALENT(S): at 14:49

## 2023-01-07 RX ADMIN — Medication 0.25 MILLIGRAM(S): at 15:16

## 2023-01-07 RX ADMIN — Medication 250 MICROGRAM(S): at 21:37

## 2023-01-07 RX ADMIN — Medication 50 MILLIGRAM(S): at 14:48

## 2023-01-07 RX ADMIN — Medication 125 MICROGRAM(S): at 10:41

## 2023-01-07 RX ADMIN — CARVEDILOL PHOSPHATE 6.25 MILLIGRAM(S): 80 CAPSULE, EXTENDED RELEASE ORAL at 10:40

## 2023-01-07 RX ADMIN — CARVEDILOL PHOSPHATE 12.5 MILLIGRAM(S): 80 CAPSULE, EXTENDED RELEASE ORAL at 21:55

## 2023-01-07 RX ADMIN — Medication 50 MILLIGRAM(S): at 21:55

## 2023-01-07 NOTE — PROGRESS NOTE ADULT - ASSESSMENT
78 y/o female with:     SDH   Uncontrolled HTN     PMH HTN, pA.fib on apixaban     Patient says her usual BP at home is ~180/120, the lowest SBP that she has seen is 135-140. She has been experiencing dizziness and headache for a few weeks and suffered around [place] time. She lowered and stopped her clonidine as she thought that was the cause of her dizziness.     Her symptoms definitely seem related to her uncontrolled/labile BP and her meds need to be reassessed. I advised her not to change BP meds w/o talking to her PCP     Her neuro status is stable at this time   Repeat CT head in am   Hold apixaban     Daughter updated at bedside  76 y/o female with:     SDH   Uncontrolled HTN     PMH HTN, pA.fib on apixaban     Patient says her usual BP at home is ~180/120, the lowest SBP that she has seen is 135-140. She has been experiencing dizziness and headache for a few weeks and suffered around [place] time. She lowered and stopped her clonidine as she thought that was the cause of her dizziness.     Her symptoms definitely seem related to her uncontrolled/labile BP and her meds need to be reassessed. I advised her not to change BP meds w/o talking to her PCP     Her neuro status is stable at this time   Repeat CT head in am   Hold apixaban   Maintain SBP<150     Daughter updated at bedside

## 2023-01-07 NOTE — ED ADULT NURSE REASSESSMENT NOTE - NS ED NURSE REASSESS COMMENT FT1
pt assisted to bathroom. reports she is no longer dizzy at this time. c/o headache 5/10 but declines pain medication at this time. cm remains AF. awaiting transport to [facility]

## 2023-01-07 NOTE — ED ADULT TRIAGE NOTE - CHIEF COMPLAINT QUOTE
PT sent to ED from [place] as a tx accepted by Dr. Alcaraz for neurosurgery. PT tx with dx of a subdural hematoma. C/O headache and dizziness since October, denies fall and injury. HX of afib on Eliquis. PT at home noted having elevated BP which prompted the hospital visit. PT a&ox4, GCS 15. Reports improvement in headach, rates pain 3/10 at this time.

## 2023-01-07 NOTE — ED PROVIDER NOTE - CLINICAL SUMMARY MEDICAL DECISION MAKING FREE TEXT BOX
77-year-old female with subdural hematoma on CT at [facility].  We will repeat head CT now and admit to neurosurgery, Dr. Alcaraz

## 2023-01-07 NOTE — ED ADULT NURSE REASSESSMENT NOTE - NS ED NURSE REASSESS COMMENT FT1
telephone report given to TAMARA Dias @ [facility] and transfer center. consent for transport obtained and charted.

## 2023-01-07 NOTE — ED ADULT NURSE REASSESSMENT NOTE - NSIMPLEMENTINTERV_GEN_ALL_ED
Implemented All Fall with Harm Risk Interventions:  [place] to call system. Call bell, personal items and telephone within reach. Instruct patient to call for assistance. Room bathroom lighting operational. Non-slip footwear when patient is off stretcher. Physically safe environment: no spills, clutter or unnecessary equipment. Stretcher in lowest position, wheels locked, appropriate side rails in place. Provide visual cue, wrist band, yellow gown, etc. Monitor gait and stability. Monitor for mental status changes and reorient to person, place, and time. Review medications for side effects contributing to fall risk. Reinforce activity limits and safety measures with patient and family. Provide visual clues: red socks.

## 2023-01-07 NOTE — ED PROVIDER NOTE - PROGRESS NOTE DETAILS
Amy BELTRAN:  2 neurosurgical PA.  Patient to be admitted to Dr. Alcaraz service.  Patient needs a repeat head CT to reevaluate SDH.  CT head ordered.

## 2023-01-07 NOTE — CONSULT NOTE ADULT - SUBJECTIVE AND OBJECTIVE BOX
PCP:    REQUESTING PHYSICIAN:    REASON FOR CONSULT:    CHIEF COMPLAINT:    HPI:  Patient is a 77y old  Female who presents with a chief complaint of     HPI:    76 yo female transferred from [facility] with a small parafalcine SDH. Pt states she has been having chronic headaches for many months, she saw a Neurologist and had outpt MRI and was told she has a very small aneurysm and will need to see a specialist which she never did. Pt then saw an ENT and had serum disimpaction and developed dizziness and vertigo afterwards. Pt has been suffering from dizziness now for a few weeks with headaches. SHe states her blood pressure has been elevated and she feels very shaky all the time. Pt states she had a fall around [place], she went to see her PMD and had an outpt head CT which she is not sure of the results.   Pt states for the past 2-3 days she has felt worse with feeling "shaky", headaches, dizziness, n/v, chest pains so she called EMS and was brought to [facility] where she was found to have a 3mm paraflacine SDH.   Pt was transferred to  and was seen and examined in the ER and d/w Dr. Alcaraz.   Pt is neurologically intact and BP well controlled. Pt has a hx of afib ob Eliquis and HYN with many drug allergies.   Rpt head CT was obtained which appears unchanged. Pt  will be admitted to the step down for close monitoring and further work up. Cardiology called to evaluate antihypertensive regimen. Pt has a history of PCI in  at [place]. She has been seen by a cardiologist there for several years. She denies chest pain or shortness of breath.       PAST MEDICAL & SURGICAL HISTORY:  High cholesterol      Hypertension      Atrial fibrillation      Diabetes  type 2      HLD (hyperlipidemia)      CKD (chronic kidney disease)      GERD (gastroesophageal reflux disease)      CVA (cerebral vascular accident)      CAD (coronary artery disease)      Myocardial infarct      H/O angioplasty      History of nasal surgery      History of heart surgery  cardiac stents      H/O  section      History of bunionectomy          FAMILY HISTORY:      Social Hx:  Nonsmoker, no drug or alcohol use    MEDICATIONS  (STANDING):  carvedilol 6.25 milliGRAM(s) Oral every 12 hours  digoxin     Tablet 125 MICROGram(s) Oral daily  hydrALAZINE 50 milliGRAM(s) Oral three times a day       Allergies    adenosine (Angioedema; Swelling)  amiodarone (Angioedema; Swelling)  amlodipine (Swelling)  atenolol (Angioedema; Swelling)  clonidine (Angioedema; Swelling)  dabutamine (Unknown)  disopyramide (Angioedema; Swelling)  felodipine (Muscle Pain)  flecainide (Angioedema; Swelling)  Hyzaar (Other)  IV Contrast (Other)  latex (Angioedema; Rash; Swelling)  losartan (Angioedema; Swelling)  metformin (Other)  Norvasc (Swelling)  Persantine (Swelling)  quinidine (Muscle Pain)  simvastatin (Angioedema; Swelling)  sotalol (Angioedema; Rash; Swelling)  sulfonylureas (Angioedema; Swelling)                     (2023 04:30)      PAST MEDICAL & SURGICAL HISTORY:  High cholesterol      Hypertension      Atrial fibrillation      Diabetes  type 2      HLD (hyperlipidemia)      CKD (chronic kidney disease)      GERD (gastroesophageal reflux disease)      CVA (cerebral vascular accident)      CAD (coronary artery disease)      Myocardial infarct      H/O angioplasty      History of nasal surgery      History of heart surgery  cardiac stents      H/O  section      History of bunionectomy          Allergies    adenosine (Angioedema; Swelling)  amiodarone (Angioedema; Swelling)  amlodipine (Swelling)  atenolol (Angioedema; Swelling)  clonidine (Angioedema; Swelling)  dabutamine (Unknown)  disopyramide (Angioedema; Swelling)  felodipine (Muscle Pain)  flecainide (Angioedema; Swelling)  Hyzaar (Other)  IV Contrast (Other)  latex (Angioedema; Rash; Swelling)  losartan (Angioedema; Swelling)  metformin (Other)  Norvasc (Swelling)  Persantine (Swelling)  quinidine (Muscle Pain)  simvastatin (Angioedema; Swelling)  sotalol (Angioedema; Rash; Swelling)  sulfonylureas (Angioedema; Swelling)    Intolerances        SOCIAL HISTORY:    FAMILY HISTORY:      MEDICATIONS:  MEDICATIONS  (STANDING):  carvedilol 6.25 milliGRAM(s) Oral every 12 hours  digoxin     Tablet 125 MICROGram(s) Oral daily  hydrALAZINE 50 milliGRAM(s) Oral three times a day  potassium chloride    Tablet ER 40 milliEquivalent(s) Oral once    MEDICATIONS  (PRN):  acetaminophen   IVPB .. 1000 milliGRAM(s) IV Intermittent every 6 hours PRN Severe Pain (7 - 10)      REVIEW OF SYSTEMS:    CONSTITUTIONAL: No weakness, fevers or chills  EYES/ENT: No visual changes;  No vertigo or throat pain   NECK: No pain or stiffness  RESPIRATORY: No cough, wheezing, hemoptysis; No shortness of breath  CARDIOVASCULAR: No chest pain or palpitations  GASTROINTESTINAL: No abdominal or epigastric pain. No nausea, vomiting, or hematemesis; No diarrhea or constipation. No melena or hematochezia.  GENITOURINARY: No dysuria, frequency or hematuria  NEUROLOGICAL: No numbness or weakness  SKIN: No itching, burning, rashes, or lesions   All other review of systems is negative unless indicated above    Vital Signs Last 24 Hrs  T(C): 36.8 (2023 09:25), Max: 37.4 (2023 02:45)  T(F): 98.2 (2023 09:25), Max: 99.3 (2023 02:45)  HR: 78 (2023 12:00) (75 - 144)  BP: 149/80 (2023 12:00) (112/53 - 193/105)  BP(mean): 99 (2023 12:00) (81 - 99)  RR: 18 (2023 12:00) (12 - 32)  SpO2: 93% (2023 12:00) (92% - 99%)    Parameters below as of 2023 08:06  Patient On (Oxygen Delivery Method): room air        I&O's Summary      PHYSICAL EXAM:    Constitutional: NAD, awake and alert, well-developed  HEENT: PERR, EOMI,  No oral cyananosis.  Neck:  supple,  No JVD  Respiratory: Breath sounds are clear bilaterally, No wheezing, rales or rhonchi  Cardiovascular: S1 and S2, regular rate and rhythm, no Murmurs, gallops or rubs  Gastrointestinal: Bowel Sounds present, soft, nontender.   Extremities: No peripheral edema. No clubbing or cyanosis.  Vascular: 2+ peripheral pulses  Neurological: A/O x 3, no focal deficits  Musculoskeletal: no calf tenderness.  Skin: No rashes.      LABS: All Labs Reviewed:                        13.9   8.41  )-----------( 247      ( 2023 06:59 )             41.6                         16.3   9.21  )-----------( 278      ( 2023 18:57 )             47.8     2023 06:59    142    |  111    |  12     ----------------------------<  98     3.2     |  24     |  0.64   2023 18:57    138    |  100    |  18     ----------------------------<  168    3.1     |  26     |  0.84     Ca    8.6        2023 06:59  Ca    9.7        2023 18:57  Phos  3.3       2023 06:59  Mg     1.8       2023 06:59    TPro  6.4    /  Alb  3.0    /  TBili  0.8    /  DBili  x      /  AST  25     /  ALT  24     /  AlkPhos  60     2023 06:59  TPro  8.0    /  Alb  3.8    /  TBili  0.5    /  DBili  x      /  AST  28     /  ALT  29     /  AlkPhos  86     2023 18:57          Blood Culture:         RADIOLOGY/EKG: Sinus tach non specific st t changes

## 2023-01-07 NOTE — ED ADULT TRIAGE NOTE - NS ED TRIAGE AVPU SCALE
Physician Notification of Admission      To: ADRIANA Andre    [address] [place] [address]    From: No att. providers found    Re: Jef Bourne, 2001    Admitted on: 1/19/2018  5:34 AM    Admitting Diagnosis:    Syncope  Bradycardia    Dear ADRIANA Andre,      Our records indicate that we have admitted a patient to [facility] Pediatrics department who has listed you as their primary care provider, and we wanted to make sure you were aware of this admission. We strive to improve patient care by facilitating active communication with our medical colleagues from around the region.    To speak with a member of the patients care team, please contact the [facility] Pediatric department at [phone].   Thank you for allowing us to participate in the care of your patient.     
Alert-The patient is alert, awake and responds to voice. The patient is oriented to time, place, and person. The triage nurse is able to obtain subjective information.

## 2023-01-07 NOTE — ED ADULT NURSE NOTE - NSFALLRSKOUTCOME_ED_ALL_ED
10/22/18 1400   CM/SW Referral Data   Referral Source Physician   Reason for Referral Protocol order set   Specify order set COPD   Patient [address] [facility] Name [facility] [place] Clarence Sprague [facility]

## 2023-01-07 NOTE — ED PROVIDER NOTE - OBJECTIVE STATEMENT
77-year-old female with history of coronary artery disease, atrial fibrillation, and hypertension transferred from [facility] ED for findings of subdural hematoma on head CT.  Patient presented there with elevated blood pressures, headaches and dizziness.  Patient was evaluated by her own PMD and had outpatient head CT December 29 which was negative at that time.  Reports she recently self discontinued one of her antihypertensive medications because she thought it was contributing to her dizziness. .Patient accepted at [facility] by Sandra Jovel.

## 2023-01-07 NOTE — H&P ADULT - NSHPPHYSICALEXAM_GEN_ALL_CORE
Constitutional: awake and alert.  HEENT: PERRLA, EOMI,   Neck: Supple.  Respiratory: Breath sounds are clear bilaterally  Cardiovascular: S1 and S2,  irregular rhythm  Gastrointestinal: soft, nontender  Extremities:  no edema  Musculoskeletal: no joint swelling/tenderness, no abnormal movements  Skin: No rashes    Neurological exam:  HF: A x O x 3. Appropriately interactive, normal affect. Speech fluent, No Aphasia or paraphasic errors. Naming /repetition intact   CN: EBE, EOMI, VFF, facial sensation normal, no NLFD, tongue midline, Palate moves equally, SCM equal bilaterally  Motor: No pronator drift, Strength 5/5 in all 4 ext, normal bulk and tone, no tremor, rigidity or bradykinesia.    Sens: Intact to light touch   Coord:  No FNFA, dysmetria, STEPHANY intact   Gait/Balance: Cannot test

## 2023-01-07 NOTE — ED ADULT NURSE NOTE - OBJECTIVE STATEMENT
patient transferred from [place] for subdural hematoma for neurology. patient complaining of headache, denies nausea, vomiting, change in vision or dizziness

## 2023-01-07 NOTE — ED ADULT NURSE NOTE - CHIEF COMPLAINT QUOTE
PT sent to ED from [place] as a tx accepted by Dr. Alcaraz for neurosurgery. PT tx with dx of a subdural hematoma. C/O headache and dizziness since October, denies fall and injury. HX of afib on Eliquis. PT at home noted having elevated BP which prompted the hospital visit. PT a&ox4, GCS 15. Reports improvement in headach, rates pain 3/10 at this time.

## 2023-01-07 NOTE — H&P ADULT - ASSESSMENT
76 yo female transferred from [facility] with a small parafalcine SDH. Pt states she has been having chronic headaches for many months, she saw a Neurologist and had outpt MRI and was told she has a very small aneurysm and will need to see a specialist which she never did. Pt then saw an ENT and had serum disimpaction and developed dizziness and vertigo afterwards. Pt has been suffering from dizziness now for a few weeks with headaches. SHe states her blood pressure has been elevated and she feels very shaky all the time. Pt states she had a fall around [place], she went to see her PMD and had an outpt head CT which she is not sure of the results.   Pt states for the past 2-3 days she has felt worse with feeling "shaky", headaches, dizziness, n/v, chest pains so she called EMS and was brought to [facility] where she was found to have a 3mm paraflacine SDH.   Pt was transferred to  and was seen and examined in the ER and d/w Dr. Alcaraz.   Pt is neurologically intact and BP well controlled. Pt has a hx of afib ob Eliquis and HYN with many drug allergies.   Rpt head CT was obtained which appears unchanged. Pt  will be admitted to the step down for close monitoring and further work up.     - admit to step down   - neuro checks q2 hr  - rpt head CT stable   - will call cardiology consult   - will discuss any further intracranial vascular imaging after outpt imaging is reviewed by DR. Alcaraz   - lemuel w/ assistance  - reg diet   - rate control   - SBP nml tensive   - d/w ICU PA   - all above d/w Dr. alcaraz who formulated the plan

## 2023-01-07 NOTE — ED PROVIDER NOTE - PHYSICAL EXAMINATION
***GEN - NAD; well appearing; A+O x3 ***HEAD - NC/AT ***EYES/NOSE - PERRL, EOMI, mucous membranes moist, no discharge ***THROAT: Oral cavity and pharynx normal. No inflammation, swelling, exudate, or lesions.  ***NECK: Neck supple  ***PULMONARY - CTA b/l, symmetric breath sounds. ***CARDIAC -s1s2, RRR, no M,G,R  ***ABDOMEN - +BS, ND, NT, soft, no guarding, no rebound, no masses   ***BACK - no CVA tenderness, Normal  spine ***EXTREMITIES - symmetric pulses, 2+ dp, capillary refill < 2 seconds ***SKIN - no rash or bruising   ***NEUROLOGIC - alert, CN 2-12 intact, reflexes nl, sensation nl, coordination nl, finger to nose nl, romberg negative, motor 5/5 RUE/LUE/RLE/LLE/EHL/Plantar flexion, no pronator drift, gait nl

## 2023-01-07 NOTE — ED PROVIDER NOTE - NS ED ROS FT
Constitutional: No fever or chills  Eyes: No visual changes  HEENT: No throat pain  CV: No chest pain  Resp: No SOB no cough  GI: No abd pain, nausea or vomiting  : No dysuria  MSK: No musculoskeletal pain  Skin: No rash  Neuro: + headache & intermittent dizziness

## 2023-01-07 NOTE — ED ADULT NURSE REASSESSMENT NOTE - NS ED NURSE REASSESS COMMENT FT1
patient reports that headache tolerable at this time, lights dimmed for comfort and warm blankets provided.

## 2023-01-07 NOTE — CONSULT NOTE ADULT - PROBLEM SELECTOR RECOMMENDATION 3
Pt's daughter reports that she was scheduled for renal bx but was unable to have it because of elevated blood pressure. Medications were reviewed and extensive list of allergies and SE's . Limited options. Suggest up titration where possible. Trial of low dose alprazolam. Consider imaging of renal arteries.

## 2023-01-07 NOTE — H&P ADULT - HISTORY OF PRESENT ILLNESS
Patient is a 77y old  Female who presents with a chief complaint of     HPI:    78 yo female transferred from [facility] with a small parafalcine SDH. Pt states she has been having chronic headaches for many months, she saw a Neurologist and had outpt MRI and was told she has a very small aneurysm and will need to see a specialist which she never did. Pt then saw an ENT and had serum disimpaction and developed dizziness and vertigo afterwards. Pt has been suffering from dizziness now for a few weeks with headaches. SHe states her blood pressure has been elevated and she feels very shaky all the time. Pt states she had a fall around [place], she went to see her PMD and had an outpt head CT which she is not sure of the results.   Pt states for the past 2-3 days she has felt worse with feeling "shaky", headaches, dizziness, n/v, chest pains so she called EMS and was brought to [facility] where she was found to have a 3mm paraflacine SDH.   Pt was transferred to  and was seen and examined in the ER and d/w Dr. Alcaraz.   Pt is neurologically intact and BP well controlled. Pt has a hx of afib ob Eliquis and HYN with many drug allergies.   Rpt head CT was obtained which appears unchanged. Pt  will be admitted to the step down for close monitoring and further work up.       PAST MEDICAL & SURGICAL HISTORY:  High cholesterol      Hypertension      Atrial fibrillation      Diabetes  type 2      HLD (hyperlipidemia)      CKD (chronic kidney disease)      GERD (gastroesophageal reflux disease)      CVA (cerebral vascular accident)      CAD (coronary artery disease)      Myocardial infarct      H/O angioplasty      History of nasal surgery      History of heart surgery  cardiac stents      H/O  section      History of bunionectomy          FAMILY HISTORY:      Social Hx:  Nonsmoker, no drug or alcohol use    MEDICATIONS  (STANDING):  carvedilol 6.25 milliGRAM(s) Oral every 12 hours  digoxin     Tablet 125 MICROGram(s) Oral daily  hydrALAZINE 50 milliGRAM(s) Oral three times a day       Allergies    adenosine (Angioedema; Swelling)  amiodarone (Angioedema; Swelling)  amlodipine (Swelling)  atenolol (Angioedema; Swelling)  clonidine (Angioedema; Swelling)  dabutamine (Unknown)  disopyramide (Angioedema; Swelling)  felodipine (Muscle Pain)  flecainide (Angioedema; Swelling)  Hyzaar (Other)  IV Contrast (Other)  latex (Angioedema; Rash; Swelling)  losartan (Angioedema; Swelling)  metformin (Other)  Norvasc (Swelling)  Persantine (Swelling)  quinidine (Muscle Pain)  simvastatin (Angioedema; Swelling)  sotalol (Angioedema; Rash; Swelling)  sulfonylureas (Angioedema; Swelling)

## 2023-01-08 DIAGNOSIS — R07.9 CHEST PAIN, UNSPECIFIED: ICD-10-CM

## 2023-01-08 LAB
ANION GAP SERPL CALC-SCNC: 6 MMOL/L — SIGNIFICANT CHANGE UP (ref 5–17)
BUN SERPL-MCNC: 21 MG/DL — SIGNIFICANT CHANGE UP (ref 7–23)
CALCIUM SERPL-MCNC: 8.8 MG/DL — SIGNIFICANT CHANGE UP (ref 8.5–10.1)
CHLORIDE SERPL-SCNC: 110 MMOL/L — HIGH (ref 96–108)
CO2 SERPL-SCNC: 25 MMOL/L — SIGNIFICANT CHANGE UP (ref 22–31)
CREAT SERPL-MCNC: 0.67 MG/DL — SIGNIFICANT CHANGE UP (ref 0.5–1.3)
EGFR: 90 ML/MIN/1.73M2 — SIGNIFICANT CHANGE UP
GLUCOSE SERPL-MCNC: 89 MG/DL — SIGNIFICANT CHANGE UP (ref 70–99)
HCT VFR BLD CALC: 42.9 % — SIGNIFICANT CHANGE UP (ref 34.5–45)
HCV AB S/CO SERPL IA: 0.08 S/CO — SIGNIFICANT CHANGE UP (ref 0–0.99)
HCV AB SERPL-IMP: SIGNIFICANT CHANGE UP
HGB BLD-MCNC: 14.2 G/DL — SIGNIFICANT CHANGE UP (ref 11.5–15.5)
MAGNESIUM SERPL-MCNC: 1.9 MG/DL — SIGNIFICANT CHANGE UP (ref 1.6–2.6)
MCHC RBC-ENTMCNC: 30.1 PG — SIGNIFICANT CHANGE UP (ref 27–34)
MCHC RBC-ENTMCNC: 33.1 GM/DL — SIGNIFICANT CHANGE UP (ref 32–36)
MCV RBC AUTO: 91.1 FL — SIGNIFICANT CHANGE UP (ref 80–100)
PHOSPHATE SERPL-MCNC: 2.9 MG/DL — SIGNIFICANT CHANGE UP (ref 2.5–4.5)
PLATELET # BLD AUTO: 248 K/UL — SIGNIFICANT CHANGE UP (ref 150–400)
POTASSIUM SERPL-MCNC: 3.3 MMOL/L — LOW (ref 3.5–5.3)
POTASSIUM SERPL-SCNC: 3.3 MMOL/L — LOW (ref 3.5–5.3)
RBC # BLD: 4.71 M/UL — SIGNIFICANT CHANGE UP (ref 3.8–5.2)
RBC # FLD: 13.8 % — SIGNIFICANT CHANGE UP (ref 10.3–14.5)
SODIUM SERPL-SCNC: 141 MMOL/L — SIGNIFICANT CHANGE UP (ref 135–145)
WBC # BLD: 7.9 K/UL — SIGNIFICANT CHANGE UP (ref 3.8–10.5)
WBC # FLD AUTO: 7.9 K/UL — SIGNIFICANT CHANGE UP (ref 3.8–10.5)

## 2023-01-08 PROCEDURE — 99233 SBSQ HOSP IP/OBS HIGH 50: CPT

## 2023-01-08 PROCEDURE — 99232 SBSQ HOSP IP/OBS MODERATE 35: CPT

## 2023-01-08 PROCEDURE — 93010 ELECTROCARDIOGRAM REPORT: CPT

## 2023-01-08 RX ORDER — SENNA PLUS 8.6 MG/1
2 TABLET ORAL AT BEDTIME
Refills: 0 | Status: DISCONTINUED | OUTPATIENT
Start: 2023-01-08 | End: 2023-01-09

## 2023-01-08 RX ORDER — CARVEDILOL PHOSPHATE 80 MG/1
12.5 CAPSULE, EXTENDED RELEASE ORAL ONCE
Refills: 0 | Status: COMPLETED | OUTPATIENT
Start: 2023-01-08 | End: 2023-01-08

## 2023-01-08 RX ORDER — POTASSIUM CHLORIDE 20 MEQ
40 PACKET (EA) ORAL ONCE
Refills: 0 | Status: COMPLETED | OUTPATIENT
Start: 2023-01-08 | End: 2023-01-08

## 2023-01-08 RX ORDER — CARVEDILOL PHOSPHATE 80 MG/1
25 CAPSULE, EXTENDED RELEASE ORAL EVERY 12 HOURS
Refills: 0 | Status: DISCONTINUED | OUTPATIENT
Start: 2023-01-08 | End: 2023-01-09

## 2023-01-08 RX ORDER — POLYETHYLENE GLYCOL 3350 17 G/17G
17 POWDER, FOR SOLUTION ORAL
Refills: 0 | Status: DISCONTINUED | OUTPATIENT
Start: 2023-01-08 | End: 2023-01-09

## 2023-01-08 RX ORDER — HEPARIN SODIUM 5000 [USP'U]/ML
5000 INJECTION INTRAVENOUS; SUBCUTANEOUS EVERY 12 HOURS
Refills: 0 | Status: DISCONTINUED | OUTPATIENT
Start: 2023-01-08 | End: 2023-01-09

## 2023-01-08 RX ADMIN — POLYETHYLENE GLYCOL 3350 17 GRAM(S): 17 POWDER, FOR SOLUTION ORAL at 20:58

## 2023-01-08 RX ADMIN — Medication 400 MILLIGRAM(S): at 09:00

## 2023-01-08 RX ADMIN — SENNA PLUS 2 TABLET(S): 8.6 TABLET ORAL at 12:03

## 2023-01-08 RX ADMIN — CARVEDILOL PHOSPHATE 25 MILLIGRAM(S): 80 CAPSULE, EXTENDED RELEASE ORAL at 20:56

## 2023-01-08 RX ADMIN — HEPARIN SODIUM 5000 UNIT(S): 5000 INJECTION INTRAVENOUS; SUBCUTANEOUS at 17:09

## 2023-01-08 RX ADMIN — CARVEDILOL PHOSPHATE 12.5 MILLIGRAM(S): 80 CAPSULE, EXTENDED RELEASE ORAL at 09:06

## 2023-01-08 RX ADMIN — CARVEDILOL PHOSPHATE 12.5 MILLIGRAM(S): 80 CAPSULE, EXTENDED RELEASE ORAL at 14:09

## 2023-01-08 RX ADMIN — Medication 50 MILLIGRAM(S): at 20:57

## 2023-01-08 RX ADMIN — Medication 50 MILLIGRAM(S): at 05:44

## 2023-01-08 RX ADMIN — Medication 50 MILLIGRAM(S): at 13:07

## 2023-01-08 RX ADMIN — Medication 1000 MILLIGRAM(S): at 09:30

## 2023-01-08 RX ADMIN — Medication 40 MILLIEQUIVALENT(S): at 09:06

## 2023-01-08 RX ADMIN — Medication 125 MICROGRAM(S): at 09:39

## 2023-01-08 NOTE — PROGRESS NOTE ADULT - ASSESSMENT
78 yo female transferred from [facility] with a small parafalcine SDH. Pt states she has been having chronic headaches for many months, she saw a Neurologist and had outpt MRI and was told she has a very small aneurysm and will need to see a specialist which she never did. Pt then saw an ENT and had serum disimpaction and developed dizziness and vertigo afterwards. Pt has been suffering from dizziness now for a few weeks with headaches. SHe states her blood pressure has been elevated and she feels very shaky all the time. Pt states she had a fall around [place], she went to see her PMD and had an outpt head CT which she is not sure of the results.   Pt states for the past 2-3 days she has felt worse with feeling "shaky", headaches, dizziness, n/v, chest pains so she called EMS and was brought to [facility] where she was found to have a 3mm parafalcine SDH.     Plan:  - No acute neurosurgical intervention  - Ok for transfer to telemetry floor  - rpt head CT stable   - Cardio consult appreciated  - BB increased today  - K+ repleted  - TTE ordered  - Critical care following appreciated  - Hospitalist consult upon txr out of SICU  - Dr. Alcaraz reviewed MRA done at [facility] May 2022: R superior hypophyseal aneurysm 2mm   - Patient will allergy to both MR and CT contrast per patient had heart attack and was hospitalized due to allergy  - Will plan to repeat MRA Head no contrast while inpatient  - OOB/PT    Discussed with Dr. Alcaraz 76 yo female transferred from [facility] with a small parafalcine SDH. Pt states she has been having chronic headaches for many months, she saw a Neurologist and had outpt MRI and was told she has a very small aneurysm and will need to see a specialist which she never did. Pt then saw an ENT and had serum disimpaction and developed dizziness and vertigo afterwards. Pt has been suffering from dizziness now for a few weeks with headaches. SHe states her blood pressure has been elevated and she feels very shaky all the time. Pt states she had a fall around [place], she went to see her PMD and had an outpt head CT which she is not sure of the results.   Pt states for the past 2-3 days she has felt worse with feeling "shaky", headaches, dizziness, n/v, chest pains so she called EMS and was brought to [facility] [place] where she was found to have a 3mm parafalcine SDH.     Plan:  - No acute neurosurgical intervention  - Ok for transfer to telemetry floor  - rpt head CT stable   - Cardio consult appreciated  - Ok to resume Eliquis in 5 days on 1/12/22  - BB increased today  - K+ repleted  - TTE ordered  - Critical care following appreciated  - Hospitalist consult upon txr out of SICU  - Dr. Alcaraz reviewed MRA done at [facility] May 2022: R superior hypophyseal aneurysm 2mm   - Patient will allergy to both MR and CT contrast per patient had heart attack and was hospitalized due to allergy  - Will plan to repeat MRA Head no contrast while inpatient  - OOB/PT  - D/c planning home when medically cleared  - SQH, SCDs DVT ppx    Discussed with Dr. Alcaraz

## 2023-01-08 NOTE — PROGRESS NOTE ADULT - RESPIRATORY
clear to auscultation bilaterally/no respiratory distress/no use of accessory muscles/breath sounds equal/respirations non-labored
clear to auscultation bilaterally/no wheezes/respirations non-labored
BVM

## 2023-01-08 NOTE — PROGRESS NOTE ADULT - ASSESSMENT
78 y/o female with:     SDH   Uncontrolled HTN     PMH HTN, pA.fib on apixaban     Patient says her usual BP at home is ~180/120, the lowest SBP that she has seen is 135-140. She has been experiencing dizziness and headache for a few weeks and suffered around [place] time. She lowered and stopped her clonidine as she thought that was the cause of her dizziness.     Her symptoms definitely seem related to her uncontrolled/labile BP and her meds need to be reassessed. I advised her not to change BP meds w/o talking to her PCP     Her neuro status is stable at this time   Repeat CT head in am   Hold apixaban   Maintain SBP<150     Daughter updated at bedside  76 y/o female with:     SDH   Uncontrolled HTN     PMH HTN, pA.fib on apixaban     Patient says her usual BP at home is ~180/120, the lowest SBP that she has seen is 135-140. She has been experiencing dizziness and headache for a few weeks and suffered around [place] time. She lowered and stopped her clonidine as she thought that was the cause of her dizziness.     Her symptoms definitely seem related to her uncontrolled/labile BP and her meds need to be reassessed. I advised her not to change BP meds w/o talking to her PCP     Her neuro status is stable at this time    Hold apixaban   Increase Coreg to 25 mg q12  Continue hydralazine   HR controlled with Digoxin   Add bowel regimen   Ambulating     Stable for transfer to floor, called Dr. Landa for hospitalist consult    Daughter updated at bedside

## 2023-01-08 NOTE — PHYSICAL THERAPY INITIAL EVALUATION ADULT - PERTINENT HX OF CURRENT PROBLEM, REHAB EVAL
Pt admitted to  secondary to non traumatic SDH, hypertension. CT brain (+) R parafalcine SDH. Pt agreeable to PT. Eager to get OOB and wants to go home.

## 2023-01-09 ENCOUNTER — TRANSCRIPTION ENCOUNTER (OUTPATIENT)
Age: 78
End: 2023-01-09

## 2023-01-09 VITALS — SYSTOLIC BLOOD PRESSURE: 142 MMHG | DIASTOLIC BLOOD PRESSURE: 90 MMHG | HEART RATE: 91 BPM

## 2023-01-09 LAB
ADD ON TEST-SPECIMEN IN LAB: SIGNIFICANT CHANGE UP
ANION GAP SERPL CALC-SCNC: 6 MMOL/L — SIGNIFICANT CHANGE UP (ref 5–17)
BUN SERPL-MCNC: 16 MG/DL — SIGNIFICANT CHANGE UP (ref 7–23)
CALCIUM SERPL-MCNC: 9.2 MG/DL — SIGNIFICANT CHANGE UP (ref 8.5–10.1)
CHLORIDE SERPL-SCNC: 106 MMOL/L — SIGNIFICANT CHANGE UP (ref 96–108)
CO2 SERPL-SCNC: 27 MMOL/L — SIGNIFICANT CHANGE UP (ref 22–31)
CREAT SERPL-MCNC: 0.71 MG/DL — SIGNIFICANT CHANGE UP (ref 0.5–1.3)
EGFR: 88 ML/MIN/1.73M2 — SIGNIFICANT CHANGE UP
GLUCOSE SERPL-MCNC: 102 MG/DL — HIGH (ref 70–99)
MAGNESIUM SERPL-MCNC: 1.9 MG/DL — SIGNIFICANT CHANGE UP (ref 1.6–2.6)
POTASSIUM SERPL-MCNC: 3.4 MMOL/L — LOW (ref 3.5–5.3)
POTASSIUM SERPL-SCNC: 3.4 MMOL/L — LOW (ref 3.5–5.3)
SODIUM SERPL-SCNC: 139 MMOL/L — SIGNIFICANT CHANGE UP (ref 135–145)

## 2023-01-09 PROCEDURE — 99233 SBSQ HOSP IP/OBS HIGH 50: CPT

## 2023-01-09 PROCEDURE — 93306 TTE W/DOPPLER COMPLETE: CPT | Mod: 26

## 2023-01-09 RX ORDER — DIGOXIN 250 MCG
1 TABLET ORAL
Qty: 0 | Refills: 0 | DISCHARGE

## 2023-01-09 RX ORDER — POTASSIUM CHLORIDE 20 MEQ
40 PACKET (EA) ORAL EVERY 4 HOURS
Refills: 0 | Status: COMPLETED | OUTPATIENT
Start: 2023-01-09 | End: 2023-01-09

## 2023-01-09 RX ORDER — CARVEDILOL PHOSPHATE 80 MG/1
1 CAPSULE, EXTENDED RELEASE ORAL
Qty: 0 | Refills: 0 | DISCHARGE

## 2023-01-09 RX ORDER — CARVEDILOL PHOSPHATE 80 MG/1
1 CAPSULE, EXTENDED RELEASE ORAL
Qty: 60 | Refills: 0
Start: 2023-01-09 | End: 2023-02-07

## 2023-01-09 RX ORDER — POLYETHYLENE GLYCOL 3350 17 G/17G
17 POWDER, FOR SOLUTION ORAL
Qty: 476 | Refills: 0
Start: 2023-01-09 | End: 2023-01-22

## 2023-01-09 RX ORDER — APIXABAN 2.5 MG/1
1 TABLET, FILM COATED ORAL
Qty: 0 | Refills: 0 | DISCHARGE

## 2023-01-09 RX ORDER — SENNA PLUS 8.6 MG/1
2 TABLET ORAL
Qty: 28 | Refills: 0
Start: 2023-01-09 | End: 2023-01-22

## 2023-01-09 RX ORDER — DIGOXIN 250 MCG
1 TABLET ORAL
Qty: 0 | Refills: 0 | DISCHARGE
Start: 2023-01-09

## 2023-01-09 RX ADMIN — Medication 40 MILLIEQUIVALENT(S): at 13:53

## 2023-01-09 RX ADMIN — CARVEDILOL PHOSPHATE 25 MILLIGRAM(S): 80 CAPSULE, EXTENDED RELEASE ORAL at 17:55

## 2023-01-09 RX ADMIN — CARVEDILOL PHOSPHATE 25 MILLIGRAM(S): 80 CAPSULE, EXTENDED RELEASE ORAL at 09:06

## 2023-01-09 RX ADMIN — Medication 50 MILLIGRAM(S): at 09:06

## 2023-01-09 RX ADMIN — Medication 125 MICROGRAM(S): at 09:06

## 2023-01-09 RX ADMIN — HEPARIN SODIUM 5000 UNIT(S): 5000 INJECTION INTRAVENOUS; SUBCUTANEOUS at 06:00

## 2023-01-09 RX ADMIN — Medication 40 MILLIEQUIVALENT(S): at 12:40

## 2023-01-09 NOTE — PROGRESS NOTE ADULT - PROBLEM SELECTOR PLAN 3
Sub optimal today. Will increase carvedilol to 25 bid.
better controlled after increasing carvedilol to 25 bid.  continue current regimen with hydralazine and carvedilol on discharge   outpatient follow up with cardiology

## 2023-01-09 NOTE — PROGRESS NOTE ADULT - PROBLEM SELECTOR PLAN 2
Hold AC for now. Rate controlled.
Hold AC for now. Rate controlled.  Echo - preliminary reading - normal LV function, no acute findings   cont. coreg - dose increased and digoxin    Cleared for discharge by cardiology

## 2023-01-09 NOTE — PROGRESS NOTE ADULT - PROBLEM SELECTOR PLAN 4
Atypical chest pain likely due to musculoskeletal - recent Fall  trop - negative   denies chest pain/SOB, palpitations at present  Cardiac monitor - Afib rate controlled   ECG - Afib  Echo - normal LV function, no acute finding
ECG today.

## 2023-01-09 NOTE — DISCHARGE NOTE NURSING/CASE MANAGEMENT/SOCIAL WORK - NSDCPEFALRISK_GEN_ALL_CORE
For information on Fall & Injury Prevention, visit: [email or web address] OR  [email or web address] OR  https://www.cdc.gov/steadi/patient.html

## 2023-01-09 NOTE — PROGRESS NOTE ADULT - SUBJECTIVE AND OBJECTIVE BOX
Patient is a 77y old  Female who presents with a chief complaint of parafalcine hemorrhage (2023 14:19)    24 hour events: transferred from [facility] for SDH    PAST MEDICAL & SURGICAL HISTORY:  High cholesterol      Hypertension      Atrial fibrillation      Diabetes  type 2      HLD (hyperlipidemia)      CKD (chronic kidney disease)      GERD (gastroesophageal reflux disease)      CVA (cerebral vascular accident)      CAD (coronary artery disease)      Myocardial infarct      H/O angioplasty      History of nasal surgery      History of heart surgery  cardiac stents      H/O  section      History of bunionectomy          Allergies    adenosine (Angioedema; Swelling)  amiodarone (Angioedema; Swelling)  amlodipine (Swelling)  atenolol (Angioedema; Swelling)  clonidine (Angioedema; Swelling)  dabutamine (Unknown)  disopyramide (Angioedema; Swelling)  felodipine (Muscle Pain)  flecainide (Angioedema; Swelling)  Hyzaar (Other)  IV Contrast (Other)  latex (Angioedema; Rash; Swelling)  losartan (Angioedema; Swelling)  metformin (Other)  Norvasc (Swelling)  Persantine (Swelling)  quinidine (Muscle Pain)  simvastatin (Angioedema; Swelling)  sotalol (Angioedema; Rash; Swelling)  sulfonylureas (Angioedema; Swelling)    Intolerances      REVIEW OF SYSTEMS: SEE BELOW       ICU Vital Signs Last 24 Hrs  T(C): 36.8 (2023 14:29), Max: 37.4 (2023 02:45)  T(F): 98.2 (2023 14:29), Max: 99.3 (2023 02:45)  HR: 77 (2023 14:19) (75 - 144)  BP: 157/85 (2023 14:19) (112/53 - 193/105)  BP(mean): 104 (2023 14:19) (81 - 104)  ABP: --  ABP(mean): --  RR: 16 (2023 14:19) (12 - 32)  SpO2: 93% (2023 14:19) (92% - 99%)    O2 Parameters below as of 2023 08:06  Patient On (Oxygen Delivery Method): room air            CAPILLARY BLOOD GLUCOSE      POCT Blood Glucose.: 160 mg/dL (2023 19:00)      I&O's Summary          MEDICATIONS  (STANDING):  carvedilol 12.5 milliGRAM(s) Oral every 12 hours  digoxin     Tablet 125 MICROGram(s) Oral daily  hydrALAZINE 50 milliGRAM(s) Oral three times a day      MEDICATIONS  (PRN):  acetaminophen   IVPB .. 1000 milliGRAM(s) IV Intermittent every 6 hours PRN Severe Pain (7 - 10)  ALPRAZolam 0.25 milliGRAM(s) Oral two times a day PRN anxiety      PHYSICAL EXAM: SEE BELOW                          13.9   8.41  )-----------( 247      ( 2023 06:59 )             41.6           142  |  111<H>  |  12  ----------------------------<  98  3.2<L>   |  24  |  0.64    Ca    8.6      2023 06:59  Phos  3.3       Mg     1.8         TPro  6.4  /  Alb  3.0<L>  /  TBili  0.8  /  DBili  x   /  AST  25  /  ALT  24  /  AlkPhos  60                        
Patient is a 77y old  Female who presents with a chief complaint of SDH (2023 12:48)    24 hour events:     PAST MEDICAL & SURGICAL HISTORY:  High cholesterol      Hypertension      Atrial fibrillation      Diabetes  type 2      HLD (hyperlipidemia)      CKD (chronic kidney disease)      GERD (gastroesophageal reflux disease)      CVA (cerebral vascular accident)      CAD (coronary artery disease)      Myocardial infarct      H/O angioplasty      History of nasal surgery      History of heart surgery  cardiac stents      H/O  section      History of bunionectomy          Allergies    adenosine (Angioedema; Swelling)  amiodarone (Angioedema; Swelling)  amlodipine (Swelling)  atenolol (Angioedema; Swelling)  clonidine (Angioedema; Swelling)  dabutamine (Unknown)  disopyramide (Angioedema; Swelling)  felodipine (Muscle Pain)  flecainide (Angioedema; Swelling)  Hyzaar (Other)  IV Contrast (Other)  latex (Angioedema; Rash; Swelling)  losartan (Angioedema; Swelling)  metformin (Other)  Norvasc (Swelling)  Persantine (Swelling)  quinidine (Muscle Pain)  simvastatin (Angioedema; Swelling)  sotalol (Angioedema; Rash; Swelling)  sulfonylureas (Angioedema; Swelling)    Intolerances      REVIEW OF SYSTEMS: SEE BELOW       ICU Vital Signs Last 24 Hrs  T(C): 36.7 (2023 16:21), Max: 36.8 (2023 20:24)  T(F): 98.1 (2023 16:21), Max: 98.2 (2023 20:24)  HR: 98 (2023 18:00) (74 - 114)  BP: 158/77 (2023 18:00) (93/76 - 168/96)  BP(mean): 95 (2023 18:00) (70 - 116)  ABP: --  ABP(mean): --  RR: 18 (2023 18:00) (10 - 21)  SpO2: 96% (2023 18:00) (92% - 97%)    O2 Parameters below as of 2023 07:00  Patient On (Oxygen Delivery Method): room air            CAPILLARY BLOOD GLUCOSE          I&O's Summary    2023 07:01  -  2023 18:41  --------------------------------------------------------  IN: 1000 mL / OUT: 0 mL / NET: 1000 mL            MEDICATIONS  (STANDING):  carvedilol 25 milliGRAM(s) Oral every 12 hours  digoxin     Tablet 125 MICROGram(s) Oral daily  heparin   Injectable 5000 Unit(s) SubCutaneous every 12 hours  hydrALAZINE 50 milliGRAM(s) Oral three times a day  polyethylene glycol 3350 17 Gram(s) Oral two times a day  senna 2 Tablet(s) Oral at bedtime      MEDICATIONS  (PRN):  acetaminophen   IVPB .. 1000 milliGRAM(s) IV Intermittent every 6 hours PRN Severe Pain (7 - 10)  ALPRAZolam 0.25 milliGRAM(s) Oral two times a day PRN anxiety      PHYSICAL EXAM: SEE BELOW                          14.2   7.90  )-----------( 248      ( 2023 05:07 )             42.9       01-08    141  |  110<H>  |  21  ----------------------------<  89  3.3<L>   |  25  |  0.67    Ca    8.8      2023 05:07  Phos  2.9     01-08  Mg     1.9     -08    TPro  6.4  /  Alb  3.0<L>  /  TBili  0.8  /  DBili  x   /  AST  25  /  ALT  24  /  AlkPhos  60  01-07                      
HPI:  76 yo female transferred from [facility] with a small parafalcine SDH. Pt states she has been having chronic headaches for many months, she saw a Neurologist and had outpt MRI and was told she has a very small aneurysm and will need to see a specialist which she never did. Pt then saw an ENT and had serum disimpaction and developed dizziness and vertigo afterwards. Pt has been suffering from dizziness now for a few weeks with headaches. SHe states her blood pressure has been elevated and she feels very shaky all the time. Pt states she had a fall around [place], she went to see her PMD and had an outpt head CT which she is not sure of the results.   Pt states for the past 2-3 days she has felt worse with feeling "shaky", headaches, dizziness, n/v, chest pains so she called EMS and was brought to [facility] where she was found to have a 3mm paraflacine SDH.   Pt was transferred to  and was seen and examined in the ER and d/w Dr. Alcaraz.   Pt is neurologically intact and BP well controlled. Pt has a hx of afib ob Eliquis and HYN with many drug allergies.   Rpt head CT was obtained which appears unchanged. Pt  will be admitted to the step down for close monitoring and further work up.     1/7: Patient seen with Dr. Alcaraz this AM no acute events overnight.  1/8- Patient seen and examined. BP still slightly elevated up to 160s. Endorsed CP and pressure earlier today was seen by Cardiology ordered EKG. Patient reports CP resolved after 10 min, no discomfort currently. She denies HA, n/v, numb/tingling, wekaness.    Vital Signs Last 24 Hrs  T(C): 36.7 (08 Jan 2023 12:40), Max: 36.8 (07 Jan 2023 14:29)  T(F): 98 (08 Jan 2023 12:40), Max: 98.2 (07 Jan 2023 14:29)  HR: 82 (08 Jan 2023 11:00) (74 - 114)  BP: 151/87 (08 Jan 2023 11:00) (93/76 - 165/83)  BP(mean): 104 (08 Jan 2023 11:00) (70 - 114)  RR: 16 (08 Jan 2023 11:00) (11 - 21)  SpO2: 94% (08 Jan 2023 11:00) (92% - 97%)    Parameters below as of 08 Jan 2023 07:00  Patient On (Oxygen Delivery Method): room air        MEDICATIONS  (STANDING):  carvedilol 25 milliGRAM(s) Oral every 12 hours  digoxin     Tablet 125 MICROGram(s) Oral daily  hydrALAZINE 50 milliGRAM(s) Oral three times a day  polyethylene glycol 3350 17 Gram(s) Oral two times a day  senna 2 Tablet(s) Oral at bedtime    MEDICATIONS  (PRN):  acetaminophen   IVPB .. 1000 milliGRAM(s) IV Intermittent every 6 hours PRN Severe Pain (7 - 10)  ALPRAZolam 0.25 milliGRAM(s) Oral two times a day PRN anxiety      PHYSICAL EXAM:  Constitutional: awake and alert.  HEENT: PERRLA, EOMI,   Neck: Supple.  Respiratory: Breath sounds are clear bilaterally  Cardiovascular: S1 and S2,  irregular rhythm  Gastrointestinal: soft, nontender  Extremities:  no edema  Musculoskeletal: no joint swelling/tenderness, no abnormal movements  Skin: No rashes    Neurological exam:  HF: A x O x 3. Appropriately interactive, normal affect. Speech fluent, No Aphasia or paraphasic errors. Naming /repetition intact   CN: BEE, EOMI, VFF, facial sensation normal, no NLFD, tongue midline, Palate moves equally, SCM equal bilaterally  Motor: No pronator drift, Strength 5/5 in all 4 ext, normal bulk and tone, no tremor, rigidity or bradykinesia.    Sens: Intact to light touch   Coord:  No FNFA, dysmetria, STEPHANY intact   Gait/Balance: Cannot test        LABS:                         14.2   7.90  )-----------( 248      ( 08 Jan 2023 05:07 )             42.9     01-08    141  |  110<H>  |  21  ----------------------------<  89  3.3<L>   |  25  |  0.67    Ca    8.8      08 Jan 2023 05:07  Phos  2.9     01-08  Mg     1.9     01-08    TPro  6.4  /  Alb  3.0<L>  /  TBili  0.8  /  DBili  x   /  AST  25  /  ALT  24  /  AlkPhos  60  01-07    LIVER FUNCTIONS - ( 07 Jan 2023 06:59 )  Alb: 3.0 g/dL / Pro: 6.4 gm/dL / ALK PHOS: 60 U/L / ALT: 24 U/L / AST: 25 U/L / GGT: x                 RADIOLOGY:  CT Head No Cont (01.07.23 @ 04:03)  IMPRESSION: Again demonstrated is a 3 mm right parafalcine subdural   hematoma extending to the right tentorium cerebelli , unchanged in the   interval.      
PCP: Dr. Griffith     REQUESTING PHYSICIAN: Hospitalist     REASON FOR CONSULT: chest discomfort     CHIEF COMPLAINT: HTN      HPI:    76 yo female transferred from [facility] with a small parafalcine SDH. Pt states she has been having chronic headaches for many months, she saw a Neurologist and had outpt MRI and was told she has a very small aneurysm and will need to see a specialist which she never did. Pt then saw an ENT and had serum disimpaction and developed dizziness and vertigo afterwards. Pt has been suffering from dizziness now for a few weeks with headaches. SHe states her blood pressure has been elevated and she feels very shaky all the time. Pt states she had a fall around [place], she went to see her PMD and had an outpt head CT which she is not sure of the results.   Pt states for the past 2-3 days she has felt worse with feeling "shaky", headaches, dizziness, n/v, chest pains so she called EMS and was brought to [facility] where she was found to have a 3mm paraflacine SDH.   Pt was transferred to  and was seen and examined in the ER and d/w Dr. Alcaraz.   Pt is neurologically intact and BP well controlled. Pt has a hx of afib ob Eliquis and HYN with many drug allergies.   Rpt head CT was obtained which appears unchanged. Pt  will be admitted to the step down for close monitoring and further work up. Cardiology called to evaluate antihypertensive regimen. Pt has a history of PCI in 2012 at [place]. She has been seen by a cardiologist there for several years. She denies chest pain or shortness of breath.   Cardiology consulted for chest discomfort.     1/8/23: Pt feels improved but she related chest discomfort, mid sternal lasting 10 min today and headache which is now improved.   1/9/23: pt. denies any chest discomfort, palpitations SOB, BP better controlled on current regimen, cleared for DC     MEDICATIONS  (STANDING):  carvedilol 6.25 milliGRAM(s) Oral every 12 hours  digoxin     Tablet 125 MICROGram(s) Oral daily  hydrALAZINE 50 milliGRAM(s) Oral three times a day    MEDICATIONS  (PRN):  acetaminophen   IVPB .. 1000 milliGRAM(s) IV Intermittent every 6 hours PRN Severe Pain (7 - 10)  ALPRAZolam 0.25 milliGRAM(s) Oral two times a day PRN anxiety    Vital Signs Last 24 Hrs  T(C): 36.2 (09 Jan 2023 08:58), Max: 36.8 (09 Jan 2023 05:42)  T(F): 97.1 (09 Jan 2023 08:58), Max: 98.2 (09 Jan 2023 05:42)  HR: 71 (09 Jan 2023 08:58) (71 - 104)  BP: 173/95 (09 Jan 2023 08:58) (122/63 - 175/99)  BP(mean): 117 (08 Jan 2023 21:00) (75 - 117)  RR: 18 (09 Jan 2023 08:58) (10 - 22)  SpO2: 97% (09 Jan 2023 08:58) (93% - 97%)    Parameters below as of 09 Jan 2023 08:58  Patient On (Oxygen Delivery Method): room air      PHYSICAL EXAM:    Constitutional: NAD, awake and alert, well-developed  HEENT: PERR, EOMI,  No oral cyananosis.  Neck:  supple,  No JVD  Respiratory: Breath sounds are clear bilaterally, No wheezing, rales or rhonchi  Cardiovascular: S1 and S2, regular rate and rhythm, no Murmurs, gallops or rubs  Gastrointestinal: Bowel Sounds present, soft, nontender.   Extremities: No peripheral edema. No clubbing or cyanosis.  Vascular: 2+ peripheral pulses  Neurological: A/O x 3, no focal deficits  Musculoskeletal: no calf tenderness.  Skin: No rashes.      LABS: All Labs Reviewed:             Daily     Daily                           14.2   7.90  )-----------( 248      ( 08 Jan 2023 05:07 )             42.9       01-09    139  |  106  |  16  ----------------------------<  102<H>  3.4<L>   |  27  |  0.71    Ca    9.2      09 Jan 2023 07:30  Phos  2.9     01-08  Mg     1.9     01-08      RADIOLOGY/EKG:< from: 12 Lead ECG (08.03.21 @ 08:02) >  Diagnosis Line Atrial fibrillation  Incomplete right bundle branch block  Anteroseptal infarct , age undetermined  ST & T wave abnormality, consider inferior ischemia  Abnormal ECG  No previous ECGs available  Confirmed by ETHAN BELTRAN, VEE REYNA ([id number]) on 8/3/2021 10:16:30 AM    < end of copied text >        ECHO/CARDIAC CATHTERIZATION/STRESS TEST:  preliminary Echo - reserved EF, no acute findings   
PCP:    REQUESTING PHYSICIAN:    REASON FOR CONSULT:    CHIEF COMPLAINT:    HPI:  Patient is a 77y old  Female who presents with a chief complaint of     HPI:    76 yo female transferred from [facility] with a small parafalcine SDH. Pt states she has been having chronic headaches for many months, she saw a Neurologist and had outpt MRI and was told she has a very small aneurysm and will need to see a specialist which she never did. Pt then saw an ENT and had serum disimpaction and developed dizziness and vertigo afterwards. Pt has been suffering from dizziness now for a few weeks with headaches. SHe states her blood pressure has been elevated and she feels very shaky all the time. Pt states she had a fall around [place], she went to see her PMD and had an outpt head CT which she is not sure of the results.   Pt states for the past 2-3 days she has felt worse with feeling "shaky", headaches, dizziness, n/v, chest pains so she called EMS and was brought to [facility] where she was found to have a 3mm paraflacine SDH.   Pt was transferred to  and was seen and examined in the ER and d/w Dr. Alcaraz.   Pt is neurologically intact and BP well controlled. Pt has a hx of afib ob Eliquis and HYN with many drug allergies.   Rpt head CT was obtained which appears unchanged. Pt  will be admitted to the step down for close monitoring and further work up. Cardiology called to evaluate antihypertensive regimen. Pt has a history of PCI in  at [place]. She has been seen by a cardiologist there for several years. She denies chest pain or shortness of breath.   23: Pt feels improved but she related chest discomfort, mid sternal lasting 10 min today and headache which is now improved.     PAST MEDICAL & SURGICAL HISTORY:  High cholesterol      Hypertension      Atrial fibrillation      Diabetes  type 2      HLD (hyperlipidemia)      CKD (chronic kidney disease)      GERD (gastroesophageal reflux disease)      CVA (cerebral vascular accident)      CAD (coronary artery disease)      Myocardial infarct      H/O angioplasty      History of nasal surgery      History of heart surgery  cardiac stents      H/O  section      History of bunionectomy          FAMILY HISTORY:      Social Hx:  Nonsmoker, no drug or alcohol use    MEDICATIONS  (STANDING):  carvedilol 6.25 milliGRAM(s) Oral every 12 hours  digoxin     Tablet 125 MICROGram(s) Oral daily  hydrALAZINE 50 milliGRAM(s) Oral three times a day       Allergies    adenosine (Angioedema; Swelling)  amiodarone (Angioedema; Swelling)  amlodipine (Swelling)  atenolol (Angioedema; Swelling)  clonidine (Angioedema; Swelling)  dabutamine (Unknown)  disopyramide (Angioedema; Swelling)  felodipine (Muscle Pain)  flecainide (Angioedema; Swelling)  Hyzaar (Other)  IV Contrast (Other)  latex (Angioedema; Rash; Swelling)  losartan (Angioedema; Swelling)  metformin (Other)  Norvasc (Swelling)  Persantine (Swelling)  quinidine (Muscle Pain)  simvastatin (Angioedema; Swelling)  sotalol (Angioedema; Rash; Swelling)  sulfonylureas (Angioedema; Swelling)                     (2023 04:30)      PAST MEDICAL & SURGICAL HISTORY:  Diabetes  type 2      HLD (hyperlipidemia)      CKD (chronic kidney disease)      GERD (gastroesophageal reflux disease)      CVA (cerebral vascular accident)      CAD (coronary artery disease)      Myocardial infarct      Hypertension      High cholesterol      Atrial fibrillation      H/O angioplasty      History of nasal surgery      History of heart surgery  cardiac stents      H/O  section      History of bunionectomy          SOCIAL HISTORY:    FAMILY HISTORY:      ALLERGIES:  Allergies    adenosine (Angioedema; Swelling)  amiodarone (Angioedema; Swelling)  amlodipine (Swelling)  atenolol (Angioedema; Swelling)  clonidine (Angioedema; Swelling)  dabutamine (Unknown)  disopyramide (Angioedema; Swelling)  felodipine (Muscle Pain)  flecainide (Angioedema; Swelling)  Hyzaar (Other)  IV Contrast (Other)  latex (Angioedema; Rash; Swelling)  losartan (Angioedema; Swelling)  metformin (Other)  Norvasc (Swelling)  Persantine (Swelling)  quinidine (Muscle Pain)  simvastatin (Angioedema; Swelling)  sotalol (Angioedema; Rash; Swelling)  sulfonylureas (Angioedema; Swelling)    Intolerances        MEDICATIONS:    MEDICATIONS  (STANDING):  carvedilol 25 milliGRAM(s) Oral every 12 hours  digoxin     Tablet 125 MICROGram(s) Oral daily  hydrALAZINE 50 milliGRAM(s) Oral three times a day    MEDICATIONS  (PRN):  acetaminophen   IVPB .. 1000 milliGRAM(s) IV Intermittent every 6 hours PRN Severe Pain (7 - 10)  ALPRAZolam 0.25 milliGRAM(s) Oral two times a day PRN anxiety      REVIEW OF SYSTEMS:    CONSTITUTIONAL: No weakness, fevers or chills  EYES/ENT: No visual changes;  No vertigo or throat pain   NECK: No pain or stiffness  RESPIRATORY: No cough, wheezing, hemoptysis; No shortness of breath  CARDIOVASCULAR: No chest pain or palpitations  GASTROINTESTINAL: No abdominal or epigastric pain. No nausea, vomiting, or hematemesis; No diarrhea or constipation. No melena or hematochezia.  GENITOURINARY: No dysuria, frequency or hematuria  NEUROLOGICAL: No numbness or weakness  SKIN: No itching, burning, rashes, or lesions   All other review of systems is negative unless indicated above    Vital Signs Last 24 Hrs  T(C): 36.7 (2023 08:20), Max: 36.8 (2023 14:29)  T(F): 98.1 (2023 08:20), Max: 98.2 (2023 14:29)  HR: 82 (2023 11:00) (74 - 114)  BP: 151/87 (2023 11:00) (93/76 - 165/83)  BP(mean): 104 (2023 11:00) (70 - 114)  RR: 16 (2023 11:00) (11 - 21)  SpO2: 94% (2023 11:00) (92% - 97%)    Parameters below as of 2023 07:00  Patient On (Oxygen Delivery Method): room air    Daily     Daily I&O's Summary      PHYSICAL EXAM:    Constitutional: NAD, awake and alert, well-developed  HEENT: PERR, EOMI,  No oral cyananosis.  Neck:  supple,  No JVD  Respiratory: Breath sounds are clear bilaterally, No wheezing, rales or rhonchi  Cardiovascular: S1 and S2, regular rate and rhythm, no Murmurs, gallops or rubs  Gastrointestinal: Bowel Sounds present, soft, nontender.   Extremities: No peripheral edema. No clubbing or cyanosis.  Vascular: 2+ peripheral pulses  Neurological: A/O x 3, no focal deficits  Musculoskeletal: no calf tenderness.  Skin: No rashes.      LABS: All Labs Reviewed:                        14.2   7.90  )-----------( 248      ( 2023 05:07 )             42.9                         13.9   8.41  )-----------( 247      ( 2023 06:59 )             41.6                         16.3   9.21  )-----------( 278      ( 2023 18:57 )             47.8     2023 05:07    141    |  110    |  21     ----------------------------<  89     3.3     |  25     |  0.67   2023 06:59    142    |  111    |  12     ----------------------------<  98     3.2     |  24     |  0.64   2023 18:57    138    |  100    |  18     ----------------------------<  168    3.1     |  26     |  0.84     Ca    8.8        2023 05:07  Ca    8.6        2023 06:59  Ca    9.7        2023 18:57  Phos  2.9       2023 05:07  Phos  3.3       2023 06:59  Mg     1.9       2023 05:07  Mg     1.8       2023 06:59    TPro  6.4    /  Alb  3.0    /  TBili  0.8    /  DBili  x      /  AST  25     /  ALT  24     /  AlkPhos  60     2023 06:59  TPro  8.0    /  Alb  3.8    /  TBili  0.5    /  DBili  x      /  AST  28     /  ALT  29     /  AlkPhos  86     2023 18:57          Blood Culture:         RADIOLOGY/EKG:< from: 12 Lead ECG (21 @ 08:02) >  Diagnosis Line Atrial fibrillation  Incomplete right bundle branch block  Anteroseptal infarct , age undetermined  ST & T wave abnormality, consider inferior ischemia  Abnormal ECG  No previous ECGs available  Confirmed by ETHAN BELTRAN, VEE REYNA ([id number]) on 8/3/2021 10:16:30 AM    < end of copied text >        ECHO/CARDIAC CATHTERIZATION/STRESS TEST:

## 2023-01-09 NOTE — DISCHARGE NOTE PROVIDER - PROVIDER TOKENS
PROVIDER:[TOKEN:[[id number]],FOLLOWUP:[2 weeks]],PROVIDER:[TOKEN:[[id number]],FOLLOWUP:[2 weeks]] PROVIDER:[TOKEN:[[id number]],FOLLOWUP:[2 weeks]],PROVIDER:[TOKEN:[[id number]],FOLLOWUP:[2 weeks]],PROVIDER:[TOKEN:[[id number]],FOLLOWUP:[2 weeks]]

## 2023-01-09 NOTE — DISCHARGE NOTE PROVIDER - CARE PROVIDER_API CALL
Sarkis Alcaraz; PhD)  Neurosurgery  [address] [place] [address]  Phone: [phone]  Fax: [phone]  Follow Up Time: 2 weeks    Sanya Gonzalez (MD)  Cardiovascular Disease  [address], [address]  [place] [address]  Phone: [phone]  Fax: [phone]  Follow Up Time: 2 weeks   Sarkis Alcaraz; PhD)  Neurosurgery  [address] [facility] [address] [place] [address]  Phone: [phone]  Fax: [phone]  Follow Up Time: 2 weeks    Sanya Gonzalez)  Cardiovascular Disease  [address], [address]  [place] [address]  Phone: [phone]  Fax: [phone]  Follow Up Time: 2 weeks    Nitza Bertrand)  Neurology  [address], [address]  [place] [address]  Phone: [phone]  Fax: [phone]  Follow Up Time: 2 weeks

## 2023-01-09 NOTE — DISCHARGE NOTE PROVIDER - NSDCCPCAREPLAN_GEN_ALL_CORE_FT
PRINCIPAL DISCHARGE DIAGNOSIS  Diagnosis: Subdural hematoma  Assessment and Plan of Treatment:       SECONDARY DISCHARGE DIAGNOSES  Diagnosis: Hypertension  Assessment and Plan of Treatment:      PRINCIPAL DISCHARGE DIAGNOSIS  Diagnosis: Subdural hematoma  Assessment and Plan of Treatment: Follow up with Dr. Alcaraz in 2 weeks. Call the office, or visit the nearest ER if you experience severe HA, lethargy, n/v, weakness. Continue activity as tolerated. Avoid strenuous activity, heavy lifting/pushing/pulling.  You may resume Eliquis starting 1/12/2023.        SECONDARY DISCHARGE DIAGNOSES  Diagnosis: Hypertension  Assessment and Plan of Treatment: Continue BP medications as it is prescribed on discharge. Do not skip and take extra medication unless advised by her doctor and cardiologist. Follow up with Dr. Welsh in 1-2 weeks upon discharge.

## 2023-01-09 NOTE — DISCHARGE NOTE NURSING/CASE MANAGEMENT/SOCIAL WORK - PATIENT PORTAL LINK FT
You can access the FollowMyHealth Patient Portal offered by [facility] by registering at the following website: [email or web address]. By joining [facility]’s FollowMyHealth portal, you will also be able to view your health information using other applications (apps) compatible with our system.

## 2023-01-09 NOTE — DISCHARGE NOTE PROVIDER - HOSPITAL COURSE
78 yo female transferred from [facility] with a small parafalcine SDH. Pt states she has been having chronic headaches for many months, she saw a Neurologist and had outpt MRI and was told she has a very small aneurysm and will need to see a specialist which she never did. Pt then saw an ENT and had serum disimpaction and developed dizziness and vertigo afterwards. Pt has been suffering from dizziness now for a few weeks with headaches. SHe states her blood pressure has been elevated and she feels very shaky all the time. Pt states she had a fall around [place], she went to see her PMD and had an outpt head CT which she is not sure of the results.   Pt states for the past 2-3 days she has felt worse with feeling "shaky", headaches, dizziness, n/v, chest pains so she called EMS and was brought to [facility] where she was found to have a 3mm paraflacine SDH.   Pt was transferred to  and was seen and examined in the ER and d/w Dr. Alcaraz.   Pt is neurologically intact and BP well controlled. Pt has a hx of afib ob Eliquis and HYN with many drug allergies.   Rpt head CT was obtained which appears unchanged. Pt  will be admitted to the step down for close monitoring and further work up.     1/7: Patient seen with Dr. Alcaraz this AM no acute events overnight.  1/8- Patient seen and examined. BP still slightly elevated up to 160s. Endorsed CP and pressure earlier today was seen by Cardiology ordered EKG. Patient reports CP resolved after 10 min, no discomfort currently. She denies HA, n/v, numb/tingling, weakness.    1/9: Patient in Echo for testing, possible d/c home today vs tomorrow pending medical clearance. Cleared by Cardiology. 76 yo female transferred from [facility] with a small parafalcine SDH. Pt states she has been having chronic headaches for many months, she saw a Neurologist and had outpt MRI and was told she has a very small aneurysm and will need to see a specialist which she never did. Pt then saw an ENT and had serum disimpaction and developed dizziness and vertigo afterwards. Pt has been suffering from dizziness now for a few weeks with headaches. SHe states her blood pressure has been elevated and she feels very shaky all the time. Pt states she had a fall around [place], she went to see her PMD and had an outpt head CT which she is not sure of the results.   Pt states for the past 2-3 days she has felt worse with feeling "shaky", headaches, dizziness, n/v, chest pains so she called EMS and was brought to [facility] where she was found to have a 3mm paraflacine SDH.   Pt was transferred to  and was seen and examined in the ER and d/w Dr. Alcaraz.   Pt is neurologically intact and BP well controlled. Pt has a hx of afib ob Eliquis and HYN with many drug allergies.   Rpt head CT was obtained which appears unchanged. Pt  will be admitted to the step down for close monitoring and further work up.     1/7: Patient seen with Dr. Alcaraz this AM no acute events overnight.  1/8- Patient seen and examined. BP still slightly elevated up to 160s. Endorsed CP and pressure earlier today was seen by Cardiology ordered EKG. Patient reports CP resolved after 10 min, no discomfort currently. She denies HA, n/v, numb/tingling, weakness.    1/9: Patient  underwent ECHO today viewed by cardiology EF within normal limits no abnormality noted. Patient is voiding, tolerating didet. Ambulating with PT recommending discharge home. Patient is stable from a medical and neurosurgical standpoint for discharge.

## 2023-01-09 NOTE — DISCHARGE NOTE PROVIDER - NSDCMRMEDTOKEN_GEN_ALL_CORE_FT
carvedilol 6.25 mg oral tablet: 1 tab(s) orally once a day (in the morning)  chlorthalidone 25 mg oral tablet: 1 tab(s) orally once a day  cloNIDine 0.1 mg oral tablet: orally once a day (at bedtime)  digoxin 125 mcg (0.125 mg) oral tablet: 1 tab(s) orally once a day  Eliquis 5 mg oral tablet: 1 tab(s) orally 2 times a day  hydrALAZINE 50 mg oral tablet: orally 3 times a day  olmesaratan medoxomil: 40 milligram(s) orally once a day  Repatha 140 mg/mL subcutaneous solution: 1 dose(s) subcutaneous every 2 weeks  riboflavin 400 mg oral capsule: 1 cap(s) orally once a day  Vitamin D2: 97539 international unit(s) orally once a week   carvedilol 25 mg oral tablet: 1 tab(s) orally every 12 hours  digoxin 125 mcg (0.125 mg) oral tablet: 1 tab(s) orally once a day  hydrALAZINE 50 mg oral tablet: orally 3 times a day  polyethylene glycol 3350 oral powder for reconstitution: 17 gram(s) orally 2 times a day  Repatha 140 mg/mL subcutaneous solution: 1 dose(s) subcutaneous every 2 weeks  riboflavin 400 mg oral capsule: 1 cap(s) orally once a day  senna leaf extract oral tablet: 2 tab(s) orally once a day (at bedtime)  Vitamin D2: 26951 international unit(s) orally once a week

## 2023-01-09 NOTE — DISCHARGE NOTE PROVIDER - CARE PROVIDERS DIRECT ADDRESSES
[email or web address]

## 2023-01-23 ENCOUNTER — APPOINTMENT (OUTPATIENT)
Dept: CT IMAGING | Facility: CLINIC | Age: 78
End: 2023-01-23
Payer: MEDICARE

## 2023-01-23 ENCOUNTER — APPOINTMENT (OUTPATIENT)
Dept: NEUROSURGERY | Facility: CLINIC | Age: 78
End: 2023-01-23
Payer: MEDICARE

## 2023-01-23 ENCOUNTER — OUTPATIENT (OUTPATIENT)
Dept: OUTPATIENT SERVICES | Facility: HOSPITAL | Age: 78
LOS: 1 days | End: 2023-01-23
Payer: MEDICARE

## 2023-01-23 VITALS
HEIGHT: 59 IN | HEART RATE: 74 BPM | WEIGHT: 126 LBS | TEMPERATURE: 98.1 F | BODY MASS INDEX: 25.4 KG/M2 | SYSTOLIC BLOOD PRESSURE: 167 MMHG | DIASTOLIC BLOOD PRESSURE: 79 MMHG | OXYGEN SATURATION: 98 %

## 2023-01-23 DIAGNOSIS — Z98.890 OTHER SPECIFIED POSTPROCEDURAL STATES: Chronic | ICD-10-CM

## 2023-01-23 DIAGNOSIS — Z87.891 PERSONAL HISTORY OF NICOTINE DEPENDENCE: ICD-10-CM

## 2023-01-23 DIAGNOSIS — S06.5X9A TRAUMATIC SUBDURAL HEMORRHAGE WITH LOSS OF CONSCIOUSNESS OF UNSPECIFIED DURATION, INITIAL ENCOUNTER: ICD-10-CM

## 2023-01-23 DIAGNOSIS — Z98.62 PERIPHERAL VASCULAR ANGIOPLASTY STATUS: Chronic | ICD-10-CM

## 2023-01-23 DIAGNOSIS — Z83.3 FAMILY HISTORY OF DIABETES MELLITUS: ICD-10-CM

## 2023-01-23 DIAGNOSIS — Z98.891 HISTORY OF UTERINE SCAR FROM PREVIOUS SURGERY: Chronic | ICD-10-CM

## 2023-01-23 DIAGNOSIS — Z78.9 OTHER SPECIFIED HEALTH STATUS: ICD-10-CM

## 2023-01-23 PROCEDURE — 70450 CT HEAD/BRAIN W/O DYE: CPT

## 2023-01-23 PROCEDURE — 70450 CT HEAD/BRAIN W/O DYE: CPT | Mod: 26

## 2023-01-23 PROCEDURE — 99213 OFFICE O/P EST LOW 20 MIN: CPT

## 2023-01-30 ENCOUNTER — APPOINTMENT (OUTPATIENT)
Dept: ORTHOPEDIC SURGERY | Facility: CLINIC | Age: 78
End: 2023-01-30

## 2023-02-06 ENCOUNTER — APPOINTMENT (OUTPATIENT)
Dept: CARDIOLOGY | Facility: CLINIC | Age: 78
End: 2023-02-06
Payer: MEDICARE

## 2023-02-06 VITALS
OXYGEN SATURATION: 97 % | BODY MASS INDEX: 25.25 KG/M2 | WEIGHT: 125 LBS | SYSTOLIC BLOOD PRESSURE: 142 MMHG | HEART RATE: 77 BPM | DIASTOLIC BLOOD PRESSURE: 80 MMHG

## 2023-02-06 PROCEDURE — 93000 ELECTROCARDIOGRAM COMPLETE: CPT

## 2023-02-06 PROCEDURE — 99214 OFFICE O/P EST MOD 30 MIN: CPT

## 2023-02-06 RX ORDER — VIT A/VIT C/VIT E/ZINC/COPPER 2148-113
TABLET ORAL
Refills: 0 | Status: ACTIVE | COMMUNITY

## 2023-02-06 RX ORDER — EVOLOCUMAB 140 MG/ML
140 INJECTION, SOLUTION SUBCUTANEOUS
Refills: 0 | Status: ACTIVE | COMMUNITY

## 2023-02-06 RX ORDER — CHROMIUM 200 MCG
TABLET ORAL
Refills: 0 | Status: ACTIVE | COMMUNITY

## 2023-02-06 NOTE — ADDENDUM
[FreeTextEntry1] : 2/6/2023 Reviewed  head CT scan with  Dr Alcaraz and there is no concern for an aneurysm.

## 2023-02-06 NOTE — DISCUSSION/SUMMARY
[FreeTextEntry1] : Pt will increase carvedilol to 12.5. She will start olmesartan 40 mg. She will follow up in 1 month.  [EKG obtained to assist in diagnosis and management of assessed problem(s)] : EKG obtained to assist in diagnosis and management of assessed problem(s)

## 2023-02-06 NOTE — HISTORY OF PRESENT ILLNESS
Patient requesting something for nausea and pain at this time. MD Wilfred Alex notified.       Nae Calvo RN  04/29/19 8870 [FreeTextEntry1] : 78 yo female presents in follow up after ICH. Pt has history of HTN, atrial fibrillation, CAD PCI x3. Pt is intolerant of multiple medications. She reports a low salt diets. She claims visual disturbances  with increased doses of Carvedilol. She denies chest pain or shortness of breath. She is tolerant of Eliquis.

## 2023-02-06 NOTE — CONSULT LETTER
[Dear  ___] : Dear  [unfilled], [Courtesy Letter:] : I had the pleasure of seeing your patient, [unfilled], in my office today. [Sincerely,] : Sincerely, [FreeTextEntry2] : Rolly Meza MD\par [address]\[place] [address]\par  [FreeTextEntry1] : This is a 77 year old female who presents with her family member today in follow up after being admitted to  for a parafalcine hemorrhage with headaches and dizziness.  The patient recalls that her headaches began months ago and she underwent an MRI after a work up from Neurology.  The patient was informed that she harbored an aneurysm and was sent to a specialist.  She then began to experience dizziness and eventually had a CT scan of the head showing a 3 mm parafalcine SDH and was admitted.  The patient had an uneventful hospital admission and was discharged without any necessary neurosurgical intervention and no neurological deficit.   The patient is currently on Eloquis for Afib and CAD since her discharge which she started on January 12, 2023.  The patient has undergone a follow up head CT today that will be reviewed at today’s visit.\par \par Today, the patient reports she is symptom free and denies any headaches and no events of dizziness.  No falls.  The patient has a long standing history of imbalance with feet numbness and associated paresthesia on the soles of her feet.  The patient is reporting a feeling of her feet being “swollen”.  The patient has no leg radiculopathy and no back pain.   \par \par Together we reviewed the head CT scan from 1/23/2023 and compared the image from 1/7/2023.  There is dwight diminished parafalcine hemorrhage on the right side.  There are no new areas of hemorrhage.  \par \par On neurologic examination, the patient is alert and oriented.  Appears well and in no distress.  Speech clear and and fluent.  Hearing normal.  No pronator drift.  No dysmetria of UE and LE.  Full strength in all muscle groups.  Gait is steady.   Difficulty walking heel to toe.   \par \par The patient has recovered well from the small SDH and the CT scan correlates to the patients clinical picture at todays visit.  We reviewed the importance of monitoring blood pressure on a routine basis.  The patient was instructed to report any neurologic symptoms that could reoccur or report to the ED.  I have ordered an EMG of the LE to determine if her feet paresthesia symptoms are related to neuropathy.  She will continue Eloquis and follow up with Cardiology.  If the patient has any concerns regarding her admission or symptoms she may contact the office.  Otherwise, she will contact the office when her EMG is complete for review and follow up on an as needed basis.  \par \par Thank you for very kindly including me in the evaluation and treatment of your patient.  Please do not hesitate to contact me should you have any concerns or questions regarding the small parafalcine hemorrhage, evaluation or proposed follow-up treatment and evaluation plan. [FreeTextEntry3] : Kimberly Lombardo, DNP, NP Nurse Practitioner Neurosurgery and Spine [facility] Assistant  [employer] [facility] [employer] email: [email or web address]  [address] P- [phone] F- [phone]

## 2023-02-06 NOTE — REVIEW OF SYSTEMS
[Numbness] : numbness [Tingling] : tingling [Ataxia] : ataxia [Negative] : Heme/Lymph [Hand Weakness] : no hand weakness [Leg Weakness] : no leg weakness [Poor Coordination] : good coordination [Migraine Headache] : no migraine headache [de-identified] : feet numbness

## 2023-02-06 NOTE — REASON FOR VISIT
[New Patient Visit] : a new patient visit [Family Member] : family member [FreeTextEntry1] : Hospital follow up - SDH

## 2023-02-06 NOTE — DATA REVIEWED
[de-identified] : Head CT scan from [facility] 1/23/2023

## 2023-02-06 NOTE — RESULTS/DATA
[FreeTextEntry1] : \par   CT Head No Cont             Final\par \par No Documents Attached\par \par \par \par \par   EXAM: [id number] - CT BRAIN  - ORDERED BY:  MATTHEW ESPINOZA\par \par \par PROCEDURE DATE:  01/23/2023\par \par \par \par INTERPRETATION:  CT BRAIN WITHOUT CONTRAST\par \par INDICATIONS:  History of traumatic subdural hemorrhage with loss of consciousness of unspecified duration, initial encounter.\par \par TECHNIQUE:  Serial axial images were obtained from the skull base to the vertex without the use of contrast.\par \par COMPARISON EXAM: 1/7/2023, 1/6/2023.\par \par FINDINGS:\par Ventricles and sulci: Parenchymal volume loss is present which is commensurate with patient age.\par Intra-axial: Periventricular small vessel white matter ischemic changes are appreciated. No intracranial mass or midline shift is present.\par Extra-axial: Further interval resorption of parafalcine and tentorial subdural hemorrhage. No new hemorrhagic collections appreciated. Deposition of calcified plaques in association with the distal intracranial bilateral vertebral arteries and bilateral carotid siphons.\par \par Calvarium: Intact.\par \par Bilateral optic globes are intact. Mild bilateral ethmoid air cell inflammatory changes. Minimal fluid within inferiorly located right-sided mastoid air cells, as on prior.\par \par IMPRESSION:\par Further interval resorption of parafalcine subdural hemorrhage. Decreased subdural hemorrhage is also noted along the tentorium. No new hemorrhagic collections appreciated.\par \par --- End of Report ---\par \par \par \par \par \par \par DAWN BEHR-VENTURA MD; Attending Radiologist\par This document has been electronically signed. Jan 23 2023  1:48PM\par \par  \par \par  Ordered by: MATTHEW ESPINOZA       Collected/Examined: 23Jan2023 12:07PM       \par Verification Required       Stage: Final       \par  Performed at: [facility] [place]       Resulted: 23Jan2023 01:37PM       Last Updated: 23Jan2023 01:51PM       Accession: [id number]

## 2023-03-20 ENCOUNTER — APPOINTMENT (OUTPATIENT)
Dept: CARDIOLOGY | Facility: CLINIC | Age: 78
End: 2023-03-20

## 2023-10-08 NOTE — ED PROVIDER NOTE - RESPIRATORY NEGATIVE STATEMENT, MLM
No PA needed> pt received on 10/2  
Pharmacy faxed in a request for prior authorization on:    Medication: Albuterol Sulfate   Dosage: 90 MCG  Quantity requested:  1  Pharmacy for prescription has been selected.    Initiation of prior authorization needed.    
no cough, and no shortness of breath.

## 2024-01-02 ENCOUNTER — EMERGENCY (EMERGENCY)
Facility: HOSPITAL | Age: 79
LOS: 0 days | Discharge: HOME | End: 2024-01-03
Attending: STUDENT IN AN ORGANIZED HEALTH CARE EDUCATION/TRAINING PROGRAM
Payer: MEDICARE

## 2024-01-02 VITALS
RESPIRATION RATE: 21 BRPM | DIASTOLIC BLOOD PRESSURE: 92 MMHG | OXYGEN SATURATION: 96 % | TEMPERATURE: 99 F | HEART RATE: 99 BPM | SYSTOLIC BLOOD PRESSURE: 146 MMHG

## 2024-01-02 VITALS
WEIGHT: 121.92 LBS | RESPIRATION RATE: 113 BRPM | SYSTOLIC BLOOD PRESSURE: 142 MMHG | OXYGEN SATURATION: 97 % | DIASTOLIC BLOOD PRESSURE: 92 MMHG

## 2024-01-02 DIAGNOSIS — I25.2 OLD MYOCARDIAL INFARCTION: ICD-10-CM

## 2024-01-02 DIAGNOSIS — Z79.01 LONG TERM (CURRENT) USE OF ANTICOAGULANTS: ICD-10-CM

## 2024-01-02 DIAGNOSIS — Z88.8 ALLERGY STATUS TO OTHER DRUGS, MEDICAMENTS AND BIOLOGICAL SUBSTANCES: ICD-10-CM

## 2024-01-02 DIAGNOSIS — B34.9 VIRAL INFECTION, UNSPECIFIED: ICD-10-CM

## 2024-01-02 DIAGNOSIS — I25.10 ATHEROSCLEROTIC HEART DISEASE OF NATIVE CORONARY ARTERY WITHOUT ANGINA PECTORIS: ICD-10-CM

## 2024-01-02 DIAGNOSIS — Z98.890 OTHER SPECIFIED POSTPROCEDURAL STATES: Chronic | ICD-10-CM

## 2024-01-02 DIAGNOSIS — I48.91 UNSPECIFIED ATRIAL FIBRILLATION: ICD-10-CM

## 2024-01-02 DIAGNOSIS — Z91.040 LATEX ALLERGY STATUS: ICD-10-CM

## 2024-01-02 DIAGNOSIS — I12.9 HYPERTENSIVE CHRONIC KIDNEY DISEASE WITH STAGE 1 THROUGH STAGE 4 CHRONIC KIDNEY DISEASE, OR UNSPECIFIED CHRONIC KIDNEY DISEASE: ICD-10-CM

## 2024-01-02 DIAGNOSIS — Z98.891 HISTORY OF UTERINE SCAR FROM PREVIOUS SURGERY: Chronic | ICD-10-CM

## 2024-01-02 DIAGNOSIS — Z98.62 PERIPHERAL VASCULAR ANGIOPLASTY STATUS: Chronic | ICD-10-CM

## 2024-01-02 DIAGNOSIS — R11.2 NAUSEA WITH VOMITING, UNSPECIFIED: ICD-10-CM

## 2024-01-02 DIAGNOSIS — R63.0 ANOREXIA: ICD-10-CM

## 2024-01-02 DIAGNOSIS — Z88.2 ALLERGY STATUS TO SULFONAMIDES: ICD-10-CM

## 2024-01-02 DIAGNOSIS — E86.0 DEHYDRATION: ICD-10-CM

## 2024-01-02 DIAGNOSIS — E11.22 TYPE 2 DIABETES MELLITUS WITH DIABETIC CHRONIC KIDNEY DISEASE: ICD-10-CM

## 2024-01-02 DIAGNOSIS — Z91.041 RADIOGRAPHIC DYE ALLERGY STATUS: ICD-10-CM

## 2024-01-02 DIAGNOSIS — Z88.6 ALLERGY STATUS TO ANALGESIC AGENT: ICD-10-CM

## 2024-01-02 DIAGNOSIS — Z86.73 PERSONAL HISTORY OF TRANSIENT ISCHEMIC ATTACK (TIA), AND CEREBRAL INFARCTION WITHOUT RESIDUAL DEFICITS: ICD-10-CM

## 2024-01-02 DIAGNOSIS — E78.5 HYPERLIPIDEMIA, UNSPECIFIED: ICD-10-CM

## 2024-01-02 DIAGNOSIS — N18.9 CHRONIC KIDNEY DISEASE, UNSPECIFIED: ICD-10-CM

## 2024-01-02 DIAGNOSIS — R52 PAIN, UNSPECIFIED: ICD-10-CM

## 2024-01-02 PROCEDURE — 99284 EMERGENCY DEPT VISIT MOD MDM: CPT

## 2024-01-02 PROCEDURE — 36415 COLL VENOUS BLD VENIPUNCTURE: CPT

## 2024-01-02 PROCEDURE — 99283 EMERGENCY DEPT VISIT LOW MDM: CPT

## 2024-01-02 PROCEDURE — 80048 BASIC METABOLIC PNL TOTAL CA: CPT

## 2024-01-02 PROCEDURE — 85027 COMPLETE CBC AUTOMATED: CPT

## 2024-01-02 RX ORDER — SODIUM CHLORIDE 9 MG/ML
1000 INJECTION INTRAMUSCULAR; INTRAVENOUS; SUBCUTANEOUS ONCE
Refills: 0 | Status: COMPLETED | OUTPATIENT
Start: 2024-01-02 | End: 2024-01-02

## 2024-01-02 NOTE — ED STATDOCS - PRINCIPAL DIAGNOSIS
Called to get the patient scheduled for the visit below had to leave voicemail. Return in about 1 month (around 8/27/2020) for med followup VV. no Viral syndrome

## 2024-01-02 NOTE — ED STATDOCS - PATIENT PORTAL LINK FT
You can access the FollowMyHealth Patient Portal offered by [facility] by registering at the following website: [email or web address]. By joining [employer]’s FollowMyHealth portal, you will also be able to view your health information using other applications (apps) compatible with our system. You can access the FollowMyHealth Patient Portal offered by [facility] by registering at the following website: [email or web address]. By joining [employer]’s FollowMyHealth portal, you will also be able to view your health information using other applications (apps) compatible with our system.

## 2024-01-02 NOTE — ED STATDOCS - OBJECTIVE STATEMENT
79 y/o F with PMHx of HTN, MI, CAD, CVA, CKD, HLD, DM, afib presents to the ED c/o flu-like sx x4 days, n/v, body aches. Pt seen by PCP today, swab not resulted. Pt given Z-pack. Endorses loss of appetite, dehydration, and chills. Pt on Eliquis. 77 y/o F with PMHx of HTN, MI, CAD, CVA, CKD, HLD, DM, afib presents to the ED c/o flu-like sx x4 days, n/v, body aches. Pt seen by PCP today, swab not resulted. Pt given Z-pack. Endorses loss of appetite, dehydration, and chills. Pt on Eliquis.

## 2024-01-02 NOTE — ED ADULT TRIAGE NOTE - CHIEF COMPLAINT QUOTE
Pt ambulatory to triage with c/o flu-like symptoms x4 days, N/V, bodyaches. Pt seen by PCP today swab not resulted. Pt with PMH of HTN, Afib on Eliquis. Pt endorsing loss of appetite.

## 2024-01-02 NOTE — ED STATDOCS - CLINICAL SUMMARY MEDICAL DECISION MAKING FREE TEXT BOX
Will get basic labs, IV hydration, discharge. Patient with viral sxs. Feels dehydrated and feels nauseous when she drinks. Has not vomited. Tolerated PO. Labs are non-actionable. Given IVF. Now wants discharge. Stable for dc.

## 2024-01-03 LAB
ANION GAP SERPL CALC-SCNC: 5 MMOL/L — SIGNIFICANT CHANGE UP (ref 5–17)
ANION GAP SERPL CALC-SCNC: 5 MMOL/L — SIGNIFICANT CHANGE UP (ref 5–17)
BUN SERPL-MCNC: 10 MG/DL — SIGNIFICANT CHANGE UP (ref 7–23)
BUN SERPL-MCNC: 10 MG/DL — SIGNIFICANT CHANGE UP (ref 7–23)
CALCIUM SERPL-MCNC: 9.4 MG/DL — SIGNIFICANT CHANGE UP (ref 8.5–10.1)
CALCIUM SERPL-MCNC: 9.4 MG/DL — SIGNIFICANT CHANGE UP (ref 8.5–10.1)
CHLORIDE SERPL-SCNC: 102 MMOL/L — SIGNIFICANT CHANGE UP (ref 96–108)
CHLORIDE SERPL-SCNC: 102 MMOL/L — SIGNIFICANT CHANGE UP (ref 96–108)
CO2 SERPL-SCNC: 28 MMOL/L — SIGNIFICANT CHANGE UP (ref 22–31)
CO2 SERPL-SCNC: 28 MMOL/L — SIGNIFICANT CHANGE UP (ref 22–31)
CREAT SERPL-MCNC: 0.86 MG/DL — SIGNIFICANT CHANGE UP (ref 0.5–1.3)
CREAT SERPL-MCNC: 0.86 MG/DL — SIGNIFICANT CHANGE UP (ref 0.5–1.3)
EGFR: 69 ML/MIN/1.73M2 — SIGNIFICANT CHANGE UP
EGFR: 69 ML/MIN/1.73M2 — SIGNIFICANT CHANGE UP
GLUCOSE SERPL-MCNC: 181 MG/DL — HIGH (ref 70–99)
GLUCOSE SERPL-MCNC: 181 MG/DL — HIGH (ref 70–99)
HCT VFR BLD CALC: 41.9 % — SIGNIFICANT CHANGE UP (ref 34.5–45)
HCT VFR BLD CALC: 41.9 % — SIGNIFICANT CHANGE UP (ref 34.5–45)
HGB BLD-MCNC: 14.2 G/DL — SIGNIFICANT CHANGE UP (ref 11.5–15.5)
HGB BLD-MCNC: 14.2 G/DL — SIGNIFICANT CHANGE UP (ref 11.5–15.5)
MCHC RBC-ENTMCNC: 30.8 PG — SIGNIFICANT CHANGE UP (ref 27–34)
MCHC RBC-ENTMCNC: 30.8 PG — SIGNIFICANT CHANGE UP (ref 27–34)
MCHC RBC-ENTMCNC: 33.9 GM/DL — SIGNIFICANT CHANGE UP (ref 32–36)
MCHC RBC-ENTMCNC: 33.9 GM/DL — SIGNIFICANT CHANGE UP (ref 32–36)
MCV RBC AUTO: 90.9 FL — SIGNIFICANT CHANGE UP (ref 80–100)
MCV RBC AUTO: 90.9 FL — SIGNIFICANT CHANGE UP (ref 80–100)
PLATELET # BLD AUTO: 191 K/UL — SIGNIFICANT CHANGE UP (ref 150–400)
PLATELET # BLD AUTO: 191 K/UL — SIGNIFICANT CHANGE UP (ref 150–400)
POTASSIUM SERPL-MCNC: 3.6 MMOL/L — SIGNIFICANT CHANGE UP (ref 3.5–5.3)
POTASSIUM SERPL-MCNC: 3.6 MMOL/L — SIGNIFICANT CHANGE UP (ref 3.5–5.3)
POTASSIUM SERPL-SCNC: 3.6 MMOL/L — SIGNIFICANT CHANGE UP (ref 3.5–5.3)
POTASSIUM SERPL-SCNC: 3.6 MMOL/L — SIGNIFICANT CHANGE UP (ref 3.5–5.3)
RBC # BLD: 4.61 M/UL — SIGNIFICANT CHANGE UP (ref 3.8–5.2)
RBC # BLD: 4.61 M/UL — SIGNIFICANT CHANGE UP (ref 3.8–5.2)
RBC # FLD: 13.2 % — SIGNIFICANT CHANGE UP (ref 10.3–14.5)
RBC # FLD: 13.2 % — SIGNIFICANT CHANGE UP (ref 10.3–14.5)
SODIUM SERPL-SCNC: 135 MMOL/L — SIGNIFICANT CHANGE UP (ref 135–145)
SODIUM SERPL-SCNC: 135 MMOL/L — SIGNIFICANT CHANGE UP (ref 135–145)
WBC # BLD: 12.67 K/UL — HIGH (ref 3.8–10.5)
WBC # BLD: 12.67 K/UL — HIGH (ref 3.8–10.5)
WBC # FLD AUTO: 12.67 K/UL — HIGH (ref 3.8–10.5)
WBC # FLD AUTO: 12.67 K/UL — HIGH (ref 3.8–10.5)

## 2024-01-03 RX ADMIN — SODIUM CHLORIDE 1000 MILLILITER(S): 9 INJECTION INTRAMUSCULAR; INTRAVENOUS; SUBCUTANEOUS at 00:05

## 2024-01-03 NOTE — ED ADULT NURSE NOTE - OBJECTIVE STATEMENT
Pt received from Juhi MCDONALD. Pt has no complaints at DC. Pt's VS retaken as in EMAR. Pt IV taken out. Pt has no other questions.

## 2024-02-21 NOTE — ED ADULT TRIAGE NOTE - NS ED NURSE AMBULANCES
The catheter was inserted into the left ventricle. Hemodynamics were performed.  and Pullback was recorded. [facility]

## 2024-06-04 ENCOUNTER — APPOINTMENT (OUTPATIENT)
Dept: CARDIOLOGY | Facility: CLINIC | Age: 79
End: 2024-06-04

## 2024-08-19 NOTE — ASU DISCHARGE PLAN (ADULT/PEDIATRIC) - BATHING
Render Risk Assessment In Note?: no
Detail Level: Simple
24/Sponge only
Additional Notes: Patient states blood pressure has been normal

## 2025-04-22 NOTE — ASU PATIENT PROFILE, ADULT - AS SC BRADEN SENSORY
Sig: Take one pill daily Sig: Apply a thin layer to the scar daily Sig: Apply to affected areas twice daily Sig: Apply pea sized amount per area at night Sig: Apply a thin layer to the affected skin twice daily Wart Solution Prescription 1: 5-Fluorouracil 5%, Salicylic Acid 70% Paste Sig: Apply a thin layer to the affected areas twice daily Sig: Wash affected areas daily. Sig: Apply a thin layer to the areas with decreased hair density 1-2 times daily Sig: Apply to affected areas on face twice daily Sig: Take one pill twice daily Detail Level: Simple Sig: Apply a thin layer to the affected areas daily Sig: Use as directed when washing hair Sig: Take one twice daily Sig: Apply to the affected skin twice daily Product Type (1): Warts Sig: Apply nightly to warts nightly under occlusion Sig: Apply a thin layer to the itching areas twice daily as needed Sig: Apply a thin layer to the affected nails daily (4) no impairment Sig: Apply twice daily for 5 days Intro Statement: I recommended the following products: